# Patient Record
Sex: FEMALE | Race: WHITE | NOT HISPANIC OR LATINO | Employment: FULL TIME | URBAN - METROPOLITAN AREA
[De-identification: names, ages, dates, MRNs, and addresses within clinical notes are randomized per-mention and may not be internally consistent; named-entity substitution may affect disease eponyms.]

---

## 2023-02-01 PROBLEM — M65.332 TRIGGER MIDDLE FINGER OF LEFT HAND: Status: ACTIVE | Noted: 2023-02-01

## 2023-02-01 PROBLEM — R60.9 EDEMA: Status: ACTIVE | Noted: 2023-02-01

## 2023-02-01 PROBLEM — D50.9 IRON DEFICIENCY ANEMIA: Status: ACTIVE | Noted: 2023-02-01

## 2023-02-01 PROBLEM — K63.5 COLON POLYPS: Status: ACTIVE | Noted: 2023-02-01

## 2023-02-01 PROBLEM — E10.9 TYPE 1 DIABETES MELLITUS (MULTI): Status: ACTIVE | Noted: 2023-02-01

## 2023-02-01 PROBLEM — E87.6 HYPOKALEMIA: Status: ACTIVE | Noted: 2023-02-01

## 2023-02-01 PROBLEM — B37.31 VAGINITIS DUE TO CANDIDA: Status: ACTIVE | Noted: 2023-02-01

## 2023-02-01 PROBLEM — M54.6 THORACIC BACK PAIN: Status: ACTIVE | Noted: 2023-02-01

## 2023-02-01 PROBLEM — J34.89 RHINORRHEA: Status: ACTIVE | Noted: 2023-02-01

## 2023-02-01 PROBLEM — I10 BENIGN ESSENTIAL HYPERTENSION: Status: ACTIVE | Noted: 2023-02-01

## 2023-02-01 PROBLEM — J30.9 ALLERGIC RHINITIS: Status: ACTIVE | Noted: 2023-02-01

## 2023-02-01 PROBLEM — K21.9 CHRONIC GERD: Status: ACTIVE | Noted: 2023-02-01

## 2023-02-01 PROBLEM — N89.8 VAGINAL DISCHARGE: Status: ACTIVE | Noted: 2023-02-01

## 2023-02-01 PROBLEM — E78.5 HYPERLIPIDEMIA: Status: ACTIVE | Noted: 2023-02-01

## 2023-02-01 PROBLEM — L60.0 INGROWN TOENAIL OF RIGHT FOOT: Status: ACTIVE | Noted: 2023-02-01

## 2023-02-01 PROBLEM — F32.1 DEPRESSION, MAJOR, SINGLE EPISODE, MODERATE (MULTI): Status: ACTIVE | Noted: 2023-02-01

## 2023-02-01 PROBLEM — E55.9 VITAMIN D DEFICIENCY: Status: ACTIVE | Noted: 2023-02-01

## 2023-02-01 PROBLEM — Z85.828 HISTORY OF SKIN CANCER: Status: ACTIVE | Noted: 2023-02-01

## 2023-02-01 PROBLEM — J01.90 ACUTE SINUSITIS: Status: ACTIVE | Noted: 2023-02-01

## 2023-02-01 PROBLEM — D16.9 BENIGN NEOPLASM OF BONE: Status: ACTIVE | Noted: 2023-02-01

## 2023-02-01 PROBLEM — H92.01 RIGHT EAR PAIN: Status: ACTIVE | Noted: 2023-02-01

## 2023-02-01 PROBLEM — G43.019 MIGRAINE WITHOUT AURA, INTRACTABLE: Status: ACTIVE | Noted: 2023-02-01

## 2023-02-01 PROBLEM — L81.7 PIGMENTED PURPURIC DERMATOSIS: Status: ACTIVE | Noted: 2023-02-01

## 2023-02-01 PROBLEM — R92.8 ABNORMAL MAMMOGRAM OF RIGHT BREAST: Status: ACTIVE | Noted: 2023-02-01

## 2023-02-01 PROBLEM — F41.1 GENERALIZED ANXIETY DISORDER: Status: ACTIVE | Noted: 2023-02-01

## 2023-02-01 PROBLEM — K31.7 GASTRIC POLYP: Status: ACTIVE | Noted: 2023-02-01

## 2023-02-01 PROBLEM — H81.10 BENIGN PAROXYSMAL POSITIONAL VERTIGO: Status: ACTIVE | Noted: 2023-02-01

## 2023-02-01 PROBLEM — R20.2 TINGLING: Status: ACTIVE | Noted: 2023-02-01

## 2023-02-01 PROBLEM — R42 DIZZINESS: Status: ACTIVE | Noted: 2023-02-01

## 2023-02-01 PROBLEM — H69.93 DYSFUNCTION OF BOTH EUSTACHIAN TUBES: Status: ACTIVE | Noted: 2023-02-01

## 2023-02-01 PROBLEM — R00.2 PALPITATIONS: Status: ACTIVE | Noted: 2023-02-01

## 2023-02-01 PROBLEM — L30.1 DYSHIDROTIC DERMATITIS: Status: ACTIVE | Noted: 2023-02-01

## 2023-02-01 RX ORDER — ROSUVASTATIN CALCIUM 10 MG/1
1 TABLET, COATED ORAL DAILY
COMMUNITY
Start: 2016-08-08 | End: 2023-05-03 | Stop reason: SDUPTHER

## 2023-02-01 RX ORDER — CALCIUM CARB/VITAMIN D3/VIT K1 500-100-40
TABLET,CHEWABLE ORAL AS NEEDED
COMMUNITY

## 2023-02-01 RX ORDER — AMLODIPINE BESYLATE 5 MG/1
1 TABLET ORAL DAILY
COMMUNITY
Start: 2021-04-20 | End: 2023-10-23 | Stop reason: SDUPTHER

## 2023-02-01 RX ORDER — INSULIN LISPRO 100 [IU]/ML
INJECTION, SOLUTION INTRAVENOUS; SUBCUTANEOUS
COMMUNITY
Start: 2021-10-04

## 2023-02-01 RX ORDER — DOCUSATE SODIUM 100 MG/1
CAPSULE, LIQUID FILLED ORAL
COMMUNITY

## 2023-02-01 RX ORDER — FERROUS SULFATE 325(65) MG
1 TABLET ORAL EVERY OTHER DAY
COMMUNITY

## 2023-02-01 RX ORDER — MECLIZINE HYDROCHLORIDE 25 MG/1
1 TABLET ORAL EVERY 6 HOURS PRN
COMMUNITY
Start: 2022-11-30

## 2023-02-01 RX ORDER — HYDROCHLOROTHIAZIDE 25 MG/1
1 TABLET ORAL DAILY
COMMUNITY
Start: 2021-04-15 | End: 2023-04-28 | Stop reason: SDUPTHER

## 2023-02-01 RX ORDER — MONTELUKAST SODIUM 10 MG/1
1 TABLET ORAL DAILY
COMMUNITY
Start: 2021-08-31 | End: 2023-10-23 | Stop reason: SDUPTHER

## 2023-02-01 RX ORDER — FLUCONAZOLE 150 MG/1
1 TABLET ORAL ONCE
COMMUNITY
Start: 2022-04-26 | End: 2023-08-01 | Stop reason: ALTCHOICE

## 2023-02-01 RX ORDER — MOMETASONE FUROATE 50 UG/1
1 SPRAY, METERED NASAL 2 TIMES DAILY
COMMUNITY
Start: 2022-08-25 | End: 2023-08-01 | Stop reason: WASHOUT

## 2023-02-01 RX ORDER — POTASSIUM CHLORIDE 1500 MG/1
1 TABLET, EXTENDED RELEASE ORAL DAILY
COMMUNITY
Start: 2021-12-02 | End: 2023-10-23 | Stop reason: SDUPTHER

## 2023-02-01 RX ORDER — FLUOXETINE HYDROCHLORIDE 40 MG/1
1 CAPSULE ORAL DAILY
COMMUNITY
Start: 2020-06-02 | End: 2024-04-08 | Stop reason: SDUPTHER

## 2023-02-01 RX ORDER — CLONAZEPAM 0.5 MG/1
0.5 TABLET ORAL DAILY
COMMUNITY
Start: 2017-05-22 | End: 2023-03-13 | Stop reason: SDUPTHER

## 2023-02-01 RX ORDER — TELMISARTAN 80 MG/1
1 TABLET ORAL DAILY
COMMUNITY
End: 2023-10-23 | Stop reason: SDUPTHER

## 2023-02-01 RX ORDER — BLOOD SUGAR DIAGNOSTIC
STRIP MISCELLANEOUS
COMMUNITY
Start: 2019-06-26

## 2023-02-01 RX ORDER — LANSOPRAZOLE 30 MG/1
1 CAPSULE, DELAYED RELEASE ORAL 2 TIMES DAILY
COMMUNITY
Start: 2016-08-08 | End: 2023-04-13 | Stop reason: SDUPTHER

## 2023-02-01 RX ORDER — CARVEDILOL 12.5 MG/1
1 TABLET ORAL 2 TIMES DAILY
COMMUNITY
Start: 2016-11-07 | End: 2023-04-26 | Stop reason: SDUPTHER

## 2023-03-08 ENCOUNTER — TELEPHONE (OUTPATIENT)
Dept: PRIMARY CARE | Facility: CLINIC | Age: 58
End: 2023-03-08
Payer: COMMERCIAL

## 2023-03-08 DIAGNOSIS — F41.1 GENERALIZED ANXIETY DISORDER: ICD-10-CM

## 2023-03-08 LAB
ALANINE AMINOTRANSFERASE (SGPT) (U/L) IN SER/PLAS: 18 U/L (ref 7–45)
ALBUMIN (G/DL) IN SER/PLAS: 4 G/DL (ref 3.4–5)
ALKALINE PHOSPHATASE (U/L) IN SER/PLAS: 114 U/L (ref 33–110)
ANION GAP IN SER/PLAS: 9 MMOL/L (ref 10–20)
ASPARTATE AMINOTRANSFERASE (SGOT) (U/L) IN SER/PLAS: 21 U/L (ref 9–39)
BILIRUBIN TOTAL (MG/DL) IN SER/PLAS: 0.5 MG/DL (ref 0–1.2)
CALCIDIOL (25 OH VITAMIN D3) (NG/ML) IN SER/PLAS: 42 NG/ML
CALCIUM (MG/DL) IN SER/PLAS: 9.2 MG/DL (ref 8.6–10.3)
CARBON DIOXIDE, TOTAL (MMOL/L) IN SER/PLAS: 32 MMOL/L (ref 21–32)
CHLORIDE (MMOL/L) IN SER/PLAS: 98 MMOL/L (ref 98–107)
CHOLESTEROL (MG/DL) IN SER/PLAS: 154 MG/DL (ref 0–199)
CHOLESTEROL IN HDL (MG/DL) IN SER/PLAS: 57.4 MG/DL
CHOLESTEROL/HDL RATIO: 2.7
CREATININE (MG/DL) IN SER/PLAS: 0.65 MG/DL (ref 0.5–1.05)
ERYTHROCYTE DISTRIBUTION WIDTH (RATIO) BY AUTOMATED COUNT: 13.3 % (ref 11.5–14.5)
ERYTHROCYTE MEAN CORPUSCULAR HEMOGLOBIN CONCENTRATION (G/DL) BY AUTOMATED: 33.2 G/DL (ref 32–36)
ERYTHROCYTE MEAN CORPUSCULAR VOLUME (FL) BY AUTOMATED COUNT: 83 FL (ref 80–100)
ERYTHROCYTES (10*6/UL) IN BLOOD BY AUTOMATED COUNT: 4.38 X10E12/L (ref 4–5.2)
ESTIMATED AVERAGE GLUCOSE FOR HBA1C: 186 MG/DL
FERRITIN (UG/LL) IN SER/PLAS: 21 UG/L (ref 8–150)
GFR FEMALE: >90 ML/MIN/1.73M2
GLUCOSE (MG/DL) IN SER/PLAS: 185 MG/DL (ref 74–99)
HEMATOCRIT (%) IN BLOOD BY AUTOMATED COUNT: 36.5 % (ref 36–46)
HEMOGLOBIN (G/DL) IN BLOOD: 12.1 G/DL (ref 12–16)
HEMOGLOBIN A1C/HEMOGLOBIN TOTAL IN BLOOD: 8.1 %
IRON (UG/DL) IN SER/PLAS: 84 UG/DL (ref 35–150)
LDL: 75 MG/DL (ref 0–99)
LEUKOCYTES (10*3/UL) IN BLOOD BY AUTOMATED COUNT: 4.8 X10E9/L (ref 4.4–11.3)
PLATELETS (10*3/UL) IN BLOOD AUTOMATED COUNT: 318 X10E9/L (ref 150–450)
POTASSIUM (MMOL/L) IN SER/PLAS: 3.6 MMOL/L (ref 3.5–5.3)
PROTEIN TOTAL: 7 G/DL (ref 6.4–8.2)
SODIUM (MMOL/L) IN SER/PLAS: 135 MMOL/L (ref 136–145)
TRIGLYCERIDE (MG/DL) IN SER/PLAS: 108 MG/DL (ref 0–149)
UREA NITROGEN (MG/DL) IN SER/PLAS: 9 MG/DL (ref 6–23)
VLDL: 22 MG/DL (ref 0–40)

## 2023-03-08 NOTE — TELEPHONE ENCOUNTER
Patient needs refill for Clonazapam 0.5mg . Preferred pharmacy [facility]          Take 0.5 tablets (0.25 mg) by mouth in the morning. And 1 pill at bedtime when necessary anxiety

## 2023-03-13 RX ORDER — CLONAZEPAM 0.5 MG/1
0.25 TABLET ORAL DAILY
Qty: 45 TABLET | Refills: 1 | Status: SHIPPED | OUTPATIENT
Start: 2023-03-13 | End: 2023-04-26 | Stop reason: SDUPTHER

## 2023-03-15 ENCOUNTER — OFFICE VISIT (OUTPATIENT)
Dept: PRIMARY CARE | Facility: CLINIC | Age: 58
End: 2023-03-15
Payer: COMMERCIAL

## 2023-03-15 VITALS
OXYGEN SATURATION: 98 % | HEIGHT: 67 IN | BODY MASS INDEX: 29.19 KG/M2 | WEIGHT: 186 LBS | TEMPERATURE: 97.1 F | HEART RATE: 79 BPM | DIASTOLIC BLOOD PRESSURE: 78 MMHG | RESPIRATION RATE: 15 BRPM | SYSTOLIC BLOOD PRESSURE: 129 MMHG

## 2023-03-15 DIAGNOSIS — E87.6 HYPOKALEMIA: ICD-10-CM

## 2023-03-15 DIAGNOSIS — D50.9 IRON DEFICIENCY ANEMIA, UNSPECIFIED IRON DEFICIENCY ANEMIA TYPE: ICD-10-CM

## 2023-03-15 DIAGNOSIS — H81.10 BENIGN PAROXYSMAL POSITIONAL VERTIGO, UNSPECIFIED LATERALITY: ICD-10-CM

## 2023-03-15 DIAGNOSIS — F32.1 DEPRESSION, MAJOR, SINGLE EPISODE, MODERATE (MULTI): ICD-10-CM

## 2023-03-15 DIAGNOSIS — E55.9 VITAMIN D DEFICIENCY: ICD-10-CM

## 2023-03-15 DIAGNOSIS — E10.65 TYPE 1 DIABETES MELLITUS WITH HYPERGLYCEMIA (MULTI): Primary | ICD-10-CM

## 2023-03-15 DIAGNOSIS — E78.2 MIXED HYPERLIPIDEMIA: ICD-10-CM

## 2023-03-15 DIAGNOSIS — I10 BENIGN ESSENTIAL HYPERTENSION: ICD-10-CM

## 2023-03-15 DIAGNOSIS — F41.1 GENERALIZED ANXIETY DISORDER: ICD-10-CM

## 2023-03-15 PROBLEM — N89.8 VAGINAL DISCHARGE: Status: RESOLVED | Noted: 2023-02-01 | Resolved: 2023-03-15

## 2023-03-15 PROBLEM — J01.90 ACUTE SINUSITIS: Status: RESOLVED | Noted: 2023-02-01 | Resolved: 2023-03-15

## 2023-03-15 PROBLEM — B37.31 VAGINITIS DUE TO CANDIDA: Status: RESOLVED | Noted: 2023-02-01 | Resolved: 2023-03-15

## 2023-03-15 PROBLEM — L60.0 INGROWN TOENAIL OF RIGHT FOOT: Status: RESOLVED | Noted: 2023-02-01 | Resolved: 2023-03-15

## 2023-03-15 PROCEDURE — 99214 OFFICE O/P EST MOD 30 MIN: CPT | Performed by: FAMILY MEDICINE

## 2023-03-15 PROCEDURE — 3074F SYST BP LT 130 MM HG: CPT | Performed by: FAMILY MEDICINE

## 2023-03-15 PROCEDURE — 1036F TOBACCO NON-USER: CPT | Performed by: FAMILY MEDICINE

## 2023-03-15 PROCEDURE — 3078F DIAST BP <80 MM HG: CPT | Performed by: FAMILY MEDICINE

## 2023-03-15 PROCEDURE — 3052F HG A1C>EQUAL 8.0%<EQUAL 9.0%: CPT | Performed by: FAMILY MEDICINE

## 2023-03-15 PROCEDURE — 4010F ACE/ARB THERAPY RXD/TAKEN: CPT | Performed by: FAMILY MEDICINE

## 2023-03-15 RX ORDER — BLOOD-GLUCOSE SENSOR
EACH MISCELLANEOUS
COMMUNITY
Start: 2023-01-20

## 2023-03-15 RX ORDER — INFUSION SET FOR INSULIN PUMP
INFUSION SETS-PARAPHERNALIA MISCELLANEOUS
COMMUNITY
Start: 2023-01-21

## 2023-03-15 RX ORDER — INSULIN PUMP SYRINGE, 3 ML
EACH MISCELLANEOUS
COMMUNITY
Start: 2023-01-23

## 2023-03-15 ASSESSMENT — ENCOUNTER SYMPTOMS
PALPITATIONS: 0
SHORTNESS OF BREATH: 0
CONFUSION: 0
CHEST TIGHTNESS: 0
FEVER: 0
CHILLS: 0
ABDOMINAL PAIN: 0
ARTHRALGIAS: 0

## 2023-03-15 NOTE — ASSESSMENT & PLAN NOTE
A1c at 8.1% continue current treatment for now keep follow-up with endocrinology reviewed diabetic diet

## 2023-03-15 NOTE — PROGRESS NOTES
"Subjective   Patient ID: Kinsey Cao is a 57 y.o. female who presents for Follow-up (4 month).    HPI today for follow-up of ongoing healthcare issues and review of lab work.  She ended up getting into see an ENT regarding her dizziness.  She said she did a hearing test which came back okay and they recently did additional diagnostic testing and she has a follow-up appointment with them scheduled for next week to review results and discuss further plans.  From a symptom standpoint she says she is doing better.  She continues to follow with her endocrinologist with regards to her diabetes she is still using an insulin pump but is in the process of trying to get a new one in hopes of getting better control of her blood sugars.    Review of Systems   Constitutional:  Negative for chills and fever.   HENT:  Negative for congestion and ear pain.    Eyes:  Negative for visual disturbance.   Respiratory:  Negative for chest tightness and shortness of breath.    Cardiovascular:  Negative for chest pain and palpitations.   Gastrointestinal:  Negative for abdominal pain.   Musculoskeletal:  Negative for arthralgias.   Skin:  Negative for pallor.   Psychiatric/Behavioral:  Negative for confusion.        Objective   /78   Pulse 79   Temp 36.2 °C (97.1 °F)   Resp 15   Ht 1.702 m (5' 7\")   Wt 84.4 kg (186 lb)   SpO2 98%   BMI 29.13 kg/m²     Physical Exam  Vitals and nursing note reviewed.   Constitutional:       General: She is not in acute distress.     Appearance: Normal appearance. She is not ill-appearing.   HENT:      Head: Normocephalic and atraumatic.      Right Ear: Tympanic membrane, ear canal and external ear normal.      Left Ear: Tympanic membrane, ear canal and external ear normal.      Mouth/Throat:      Pharynx: Oropharynx is clear.   Eyes:      Extraocular Movements: Extraocular movements intact.   Cardiovascular:      Rate and Rhythm: Normal rate and regular rhythm.      Pulses: Normal " pulses.      Heart sounds: Normal heart sounds.   Pulmonary:      Effort: Pulmonary effort is normal.      Breath sounds: Normal breath sounds.   Abdominal:      General: Abdomen is flat. Bowel sounds are normal.      Palpations: Abdomen is soft.      Tenderness: There is no abdominal tenderness.   Musculoskeletal:         General: Normal range of motion.      Cervical back: Neck supple.   Skin:     General: Skin is warm.   Neurological:      Mental Status: She is alert and oriented to person, place, and time. Mental status is at baseline.   Psychiatric:         Mood and Affect: Mood normal.     Return to office 4 months with repeat fasting labs  Patient is in the process of scheduling her screening colonoscopy with Dr. Alves  Follow-up with ENT    Assessment/Plan   Problem List Items Addressed This Visit       Benign essential hypertension     Stable continue current treatment         Relevant Orders    Comprehensive Metabolic Panel    Follow Up In Primary Care    Benign paroxysmal positional vertigo     Improved complete evaluation with ENT         Depression, major, single episode, moderate (CMS/HCC)     Stable continue fluoxetine         Generalized anxiety disorder     Stable continue clonazepam         Hyperlipidemia     Stable continue store treatment and dietary modification         Relevant Orders    Comprehensive Metabolic Panel    Lipid Panel    Follow Up In Primary Care    Hypokalemia     Stable continue potassium supplementation         Relevant Orders    Follow Up In Primary Care    Iron deficiency anemia     Stable continue current supplementation and monitor         Relevant Orders    CBC    Ferritin    Iron    Follow Up In Primary Care    Type 1 diabetes mellitus (CMS/HCC) - Primary     A1c at 8.1% continue current treatment for now keep follow-up with endocrinology reviewed diabetic diet         Relevant Orders    Comprehensive Metabolic Panel    Hemoglobin A1C    Albumin , Urine Random    Follow Up  In Primary Care    Vitamin D deficiency     Stable continue to monitor

## 2023-04-12 ENCOUNTER — TELEPHONE (OUTPATIENT)
Dept: PRIMARY CARE | Facility: CLINIC | Age: 58
End: 2023-04-12
Payer: COMMERCIAL

## 2023-04-12 DIAGNOSIS — K21.9 CHRONIC GERD: ICD-10-CM

## 2023-04-12 NOTE — TELEPHONE ENCOUNTER
Rx Refill Request Telephone Encounter    Name:  Kinsey Cao  :  [id number]  Medication Name:        Lansoprazole 30 MG Oral Capsule delayed release take 1 cap daily                    Specific Pharmacy location:   [facility]    Date of last appointment:  03/15/2023  Date of next appointment:  2023  Best number to reach patient:  [phone]

## 2023-04-13 RX ORDER — LANSOPRAZOLE 30 MG/1
30 CAPSULE, DELAYED RELEASE ORAL 2 TIMES DAILY
Qty: 180 CAPSULE | Refills: 0 | Status: SHIPPED | OUTPATIENT
Start: 2023-04-13 | End: 2023-06-14 | Stop reason: SDUPTHER

## 2023-04-26 ENCOUNTER — TELEPHONE (OUTPATIENT)
Dept: PRIMARY CARE | Facility: CLINIC | Age: 58
End: 2023-04-26
Payer: COMMERCIAL

## 2023-04-26 DIAGNOSIS — I10 BENIGN ESSENTIAL HYPERTENSION: ICD-10-CM

## 2023-04-26 DIAGNOSIS — F41.1 GENERALIZED ANXIETY DISORDER: ICD-10-CM

## 2023-04-26 RX ORDER — CARVEDILOL 12.5 MG/1
12.5 TABLET ORAL 2 TIMES DAILY
Qty: 180 TABLET | Refills: 3 | Status: SHIPPED | OUTPATIENT
Start: 2023-04-26 | End: 2024-04-08 | Stop reason: SDUPTHER

## 2023-04-26 RX ORDER — CLONAZEPAM 0.5 MG/1
0.25 TABLET ORAL DAILY
Qty: 45 TABLET | Refills: 1 | Status: SHIPPED | OUTPATIENT
Start: 2023-04-26 | End: 2023-07-26 | Stop reason: SDUPTHER

## 2023-04-26 NOTE — TELEPHONE ENCOUNTER
Rx Refill Request Telephone Encounter    Name:  Kinsey Cao  :  [id number]  Medication Names: Hydrochlorothiazide 25MG tablet     Clonazepam .5MG Tablet    Carvedilol 12.5MG Tablet      Specific Pharmacy location:  [facility]/pharmacy [id number] [place] [address] [address]   Date of last appointment: 3/15/23   Date of next appointment: 23  Best number to reach patient: [phone]        The pharmacy needs authorization from Dr. Cooper to fill the 90 day scripts that were sent in previously.

## 2023-04-28 ENCOUNTER — TELEPHONE (OUTPATIENT)
Dept: PRIMARY CARE | Facility: CLINIC | Age: 58
End: 2023-04-28
Payer: COMMERCIAL

## 2023-04-28 DIAGNOSIS — I10 BENIGN ESSENTIAL HYPERTENSION: ICD-10-CM

## 2023-04-28 RX ORDER — HYDROCHLOROTHIAZIDE 25 MG/1
25 TABLET ORAL DAILY
Qty: 90 TABLET | Refills: 3 | Status: SHIPPED | OUTPATIENT
Start: 2023-04-28 | End: 2024-04-08 | Stop reason: SDUPTHER

## 2023-04-28 NOTE — TELEPHONE ENCOUNTER
Rx Refill Request Telephone Encounter    Name:  Kinsey Cao  :  [id number]  Medication Name:    Hydrochlorothiazide 25mg tablet   Specific Pharmacy location:    [facility]/pharmacy #[address] [place] [address] [address]      Date of last appointment: 3/15/23    Date of next appointment: 23   Best number to reach patient: [phone]

## 2023-05-03 ENCOUNTER — TELEPHONE (OUTPATIENT)
Dept: PRIMARY CARE | Facility: CLINIC | Age: 58
End: 2023-05-03
Payer: COMMERCIAL

## 2023-05-03 DIAGNOSIS — E78.2 MIXED HYPERLIPIDEMIA: Primary | ICD-10-CM

## 2023-05-03 RX ORDER — ROSUVASTATIN CALCIUM 10 MG/1
10 TABLET, COATED ORAL DAILY
Qty: 90 TABLET | Refills: 3 | Status: SHIPPED | OUTPATIENT
Start: 2023-05-03 | End: 2024-05-02

## 2023-05-03 NOTE — TELEPHONE ENCOUNTER
Rx Refill Request Telephone Encounter    Name:  Kinsey Cao  :  [id number]  Medication Name: rosuvastatin (Crestor) 10 mg tablet      Specific Pharmacy location:   [facility]/pharmacy #[address] [place] [address] [address]      Date of last appointment: 3/15/23    Date of next appointment:  23  Best number to reach patient:  [phone]

## 2023-06-14 ENCOUNTER — TELEPHONE (OUTPATIENT)
Dept: PRIMARY CARE | Facility: CLINIC | Age: 58
End: 2023-06-14
Payer: COMMERCIAL

## 2023-06-14 DIAGNOSIS — K21.9 CHRONIC GERD: ICD-10-CM

## 2023-06-14 RX ORDER — LANSOPRAZOLE 30 MG/1
30 CAPSULE, DELAYED RELEASE ORAL 2 TIMES DAILY
Qty: 180 CAPSULE | Refills: 0 | Status: SHIPPED | OUTPATIENT
Start: 2023-06-14 | End: 2023-08-10 | Stop reason: SDUPTHER

## 2023-06-14 NOTE — TELEPHONE ENCOUNTER
Rx Refill Request Telephone Encounter    Name:  Kinsey Cao  :  [id number]  Medication Name:      Lansoprazole 30 MG Oral Capsule delayed release take 1 cap twice daily                Specific Pharmacy location:  [facility]  Date of last appointment:  03/15/2023  Date of next appointment:  2023  Best number to reach patient:  [phone]

## 2023-07-10 ENCOUNTER — TELEPHONE (OUTPATIENT)
Dept: PRIMARY CARE | Facility: CLINIC | Age: 58
End: 2023-07-10
Payer: COMMERCIAL

## 2023-07-10 DIAGNOSIS — F41.1 GENERALIZED ANXIETY DISORDER: Primary | ICD-10-CM

## 2023-07-10 DIAGNOSIS — Z13.29 THYROID DISORDER SCREEN: ICD-10-CM

## 2023-07-10 NOTE — TELEPHONE ENCOUNTER
Patient called in and said she was experiencing some symptoms of anxiety, trouble sleeping, as well as some issues with hair loss and a possible rash on her neck. She believes this may have something to do with her thyroid, and wanted to possibly come in to be evaluated, or whatever you recommend she do to combat this.

## 2023-07-19 ENCOUNTER — APPOINTMENT (OUTPATIENT)
Dept: PRIMARY CARE | Facility: CLINIC | Age: 58
End: 2023-07-19
Payer: COMMERCIAL

## 2023-07-26 ENCOUNTER — TELEPHONE (OUTPATIENT)
Dept: PRIMARY CARE | Facility: CLINIC | Age: 58
End: 2023-07-26
Payer: COMMERCIAL

## 2023-07-26 DIAGNOSIS — F41.1 GENERALIZED ANXIETY DISORDER: ICD-10-CM

## 2023-07-26 RX ORDER — CLONAZEPAM 0.5 MG/1
TABLET ORAL
Qty: 45 TABLET | Refills: 2 | Status: SHIPPED | OUTPATIENT
Start: 2023-07-26 | End: 2023-12-11 | Stop reason: SDUPTHER

## 2023-07-26 NOTE — TELEPHONE ENCOUNTER
Rx Refill Request Telephone Encounter    Name:  Kinsey Cao  :  [id number]  Medication Name:      clonazePAM (KlonoPIN) 0.5 mg tablet            Specific Pharmacy location: [facility]/pharmacy #[address] [place] [address] [address]    Date of last appointment:  3/15/23  Date of next appointment: 23   Best number to reach patient: [phone]

## 2023-07-27 ENCOUNTER — LAB (OUTPATIENT)
Dept: LAB | Facility: LAB | Age: 58
End: 2023-07-27
Payer: COMMERCIAL

## 2023-07-27 DIAGNOSIS — F41.1 GENERALIZED ANXIETY DISORDER: ICD-10-CM

## 2023-07-27 DIAGNOSIS — D50.9 IRON DEFICIENCY ANEMIA, UNSPECIFIED IRON DEFICIENCY ANEMIA TYPE: ICD-10-CM

## 2023-07-27 DIAGNOSIS — Z13.29 THYROID DISORDER SCREEN: ICD-10-CM

## 2023-07-27 DIAGNOSIS — I10 BENIGN ESSENTIAL HYPERTENSION: ICD-10-CM

## 2023-07-27 DIAGNOSIS — E78.2 MIXED HYPERLIPIDEMIA: ICD-10-CM

## 2023-07-27 DIAGNOSIS — E10.65 TYPE 1 DIABETES MELLITUS WITH HYPERGLYCEMIA (MULTI): ICD-10-CM

## 2023-07-27 LAB
ALANINE AMINOTRANSFERASE (SGPT) (U/L) IN SER/PLAS: 21 U/L (ref 7–45)
ALBUMIN (G/DL) IN SER/PLAS: 3.9 G/DL (ref 3.4–5)
ALKALINE PHOSPHATASE (U/L) IN SER/PLAS: 133 U/L (ref 33–110)
ANION GAP IN SER/PLAS: 10 MMOL/L (ref 10–20)
ASPARTATE AMINOTRANSFERASE (SGOT) (U/L) IN SER/PLAS: 23 U/L (ref 9–39)
BILIRUBIN TOTAL (MG/DL) IN SER/PLAS: 0.4 MG/DL (ref 0–1.2)
CALCIUM (MG/DL) IN SER/PLAS: 8.9 MG/DL (ref 8.6–10.3)
CARBON DIOXIDE, TOTAL (MMOL/L) IN SER/PLAS: 30 MMOL/L (ref 21–32)
CHLORIDE (MMOL/L) IN SER/PLAS: 99 MMOL/L (ref 98–107)
CHOLESTEROL (MG/DL) IN SER/PLAS: 131 MG/DL (ref 0–199)
CHOLESTEROL IN HDL (MG/DL) IN SER/PLAS: 53.2 MG/DL
CHOLESTEROL/HDL RATIO: 2.5
CREATININE (MG/DL) IN SER/PLAS: 0.68 MG/DL (ref 0.5–1.05)
ERYTHROCYTE DISTRIBUTION WIDTH (RATIO) BY AUTOMATED COUNT: 13.2 % (ref 11.5–14.5)
ERYTHROCYTE MEAN CORPUSCULAR HEMOGLOBIN CONCENTRATION (G/DL) BY AUTOMATED: 33 G/DL (ref 32–36)
ERYTHROCYTE MEAN CORPUSCULAR VOLUME (FL) BY AUTOMATED COUNT: 83 FL (ref 80–100)
ERYTHROCYTES (10*6/UL) IN BLOOD BY AUTOMATED COUNT: 4.18 X10E12/L (ref 4–5.2)
ESTIMATED AVERAGE GLUCOSE FOR HBA1C: 192 MG/DL
FERRITIN (UG/LL) IN SER/PLAS: 29 UG/L (ref 8–150)
GFR FEMALE: >90 ML/MIN/1.73M2
GLUCOSE (MG/DL) IN SER/PLAS: 118 MG/DL (ref 74–99)
HEMATOCRIT (%) IN BLOOD BY AUTOMATED COUNT: 34.5 % (ref 36–46)
HEMOGLOBIN (G/DL) IN BLOOD: 11.4 G/DL (ref 12–16)
HEMOGLOBIN A1C/HEMOGLOBIN TOTAL IN BLOOD: 8.3 %
IRON (UG/DL) IN SER/PLAS: 75 UG/DL (ref 35–150)
LDL: 65 MG/DL (ref 0–99)
LEUKOCYTES (10*3/UL) IN BLOOD BY AUTOMATED COUNT: 5.1 X10E9/L (ref 4.4–11.3)
PLATELETS (10*3/UL) IN BLOOD AUTOMATED COUNT: 275 X10E9/L (ref 150–450)
POTASSIUM (MMOL/L) IN SER/PLAS: 3.5 MMOL/L (ref 3.5–5.3)
PROTEIN TOTAL: 6.8 G/DL (ref 6.4–8.2)
SODIUM (MMOL/L) IN SER/PLAS: 135 MMOL/L (ref 136–145)
THYROTROPIN (MIU/L) IN SER/PLAS BY DETECTION LIMIT <= 0.05 MIU/L: 1.75 MIU/L (ref 0.44–3.98)
TRIGLYCERIDE (MG/DL) IN SER/PLAS: 63 MG/DL (ref 0–149)
UREA NITROGEN (MG/DL) IN SER/PLAS: 8 MG/DL (ref 6–23)
VLDL: 13 MG/DL (ref 0–40)

## 2023-07-27 PROCEDURE — 85027 COMPLETE CBC AUTOMATED: CPT

## 2023-07-27 PROCEDURE — 84443 ASSAY THYROID STIM HORMONE: CPT

## 2023-07-27 PROCEDURE — 80053 COMPREHEN METABOLIC PANEL: CPT

## 2023-07-27 PROCEDURE — 80061 LIPID PANEL: CPT

## 2023-07-27 PROCEDURE — 82570 ASSAY OF URINE CREATININE: CPT

## 2023-07-27 PROCEDURE — 82728 ASSAY OF FERRITIN: CPT

## 2023-07-27 PROCEDURE — 83540 ASSAY OF IRON: CPT

## 2023-07-27 PROCEDURE — 83036 HEMOGLOBIN GLYCOSYLATED A1C: CPT

## 2023-07-27 PROCEDURE — 82043 UR ALBUMIN QUANTITATIVE: CPT

## 2023-07-27 PROCEDURE — 36415 COLL VENOUS BLD VENIPUNCTURE: CPT

## 2023-07-28 LAB
ALBUMIN (MG/L) IN URINE: <7 MG/L
ALBUMIN/CREATININE (UG/MG) IN URINE: NORMAL UG/MG CRT (ref 0–30)
CREATININE (MG/DL) IN URINE: 54.8 MG/DL (ref 20–320)

## 2023-08-01 ENCOUNTER — OFFICE VISIT (OUTPATIENT)
Dept: PRIMARY CARE | Facility: CLINIC | Age: 58
End: 2023-08-01
Payer: COMMERCIAL

## 2023-08-01 VITALS
BODY MASS INDEX: 28.19 KG/M2 | TEMPERATURE: 96.5 F | RESPIRATION RATE: 14 BRPM | SYSTOLIC BLOOD PRESSURE: 128 MMHG | HEART RATE: 81 BPM | OXYGEN SATURATION: 98 % | WEIGHT: 180 LBS | DIASTOLIC BLOOD PRESSURE: 81 MMHG

## 2023-08-01 DIAGNOSIS — F32.1 DEPRESSION, MAJOR, SINGLE EPISODE, MODERATE (MULTI): ICD-10-CM

## 2023-08-01 DIAGNOSIS — E10.65 TYPE 1 DIABETES MELLITUS WITH HYPERGLYCEMIA (MULTI): Primary | ICD-10-CM

## 2023-08-01 DIAGNOSIS — E55.9 VITAMIN D DEFICIENCY: ICD-10-CM

## 2023-08-01 DIAGNOSIS — D50.9 IRON DEFICIENCY ANEMIA, UNSPECIFIED IRON DEFICIENCY ANEMIA TYPE: ICD-10-CM

## 2023-08-01 DIAGNOSIS — E78.2 MIXED HYPERLIPIDEMIA: ICD-10-CM

## 2023-08-01 DIAGNOSIS — E87.6 HYPOKALEMIA: ICD-10-CM

## 2023-08-01 DIAGNOSIS — I10 BENIGN ESSENTIAL HYPERTENSION: ICD-10-CM

## 2023-08-01 PROCEDURE — 4010F ACE/ARB THERAPY RXD/TAKEN: CPT | Performed by: FAMILY MEDICINE

## 2023-08-01 PROCEDURE — 1036F TOBACCO NON-USER: CPT | Performed by: FAMILY MEDICINE

## 2023-08-01 PROCEDURE — 3074F SYST BP LT 130 MM HG: CPT | Performed by: FAMILY MEDICINE

## 2023-08-01 PROCEDURE — 99214 OFFICE O/P EST MOD 30 MIN: CPT | Performed by: FAMILY MEDICINE

## 2023-08-01 PROCEDURE — 3079F DIAST BP 80-89 MM HG: CPT | Performed by: FAMILY MEDICINE

## 2023-08-01 PROCEDURE — 3052F HG A1C>EQUAL 8.0%<EQUAL 9.0%: CPT | Performed by: FAMILY MEDICINE

## 2023-08-01 RX ORDER — TERCONAZOLE 8 MG/G
1 CREAM VAGINAL NIGHTLY
COMMUNITY

## 2023-08-01 RX ORDER — ACETAMINOPHEN 500 MG
50 TABLET ORAL 2 TIMES DAILY
COMMUNITY

## 2023-08-01 ASSESSMENT — ENCOUNTER SYMPTOMS
FEVER: 0
CONFUSION: 0
PALPITATIONS: 0
CHEST TIGHTNESS: 0
CHILLS: 0
SHORTNESS OF BREATH: 0
ARTHRALGIAS: 0
ABDOMINAL PAIN: 0

## 2023-08-01 NOTE — ASSESSMENT & PLAN NOTE
A1c 8.3% she continues to work closely with endocrinology with regards to insulin pump and diabetes management.

## 2023-08-01 NOTE — PROGRESS NOTES
Subjective   Patient ID: Kinsey Cao is a 57 y.o. female who presents for Follow-up (4 month).    HPI   Patient today for follow-up of ongoing healthcare issues and review of lab work.  She continues to follow closely with her endocrinologist with regards to diabetic management.  She is also scheduled within the week to have colonoscopy updated.  Review of Systems   Constitutional:  Negative for chills and fever.   HENT:  Negative for congestion and ear pain.    Eyes:  Negative for visual disturbance.   Respiratory:  Negative for chest tightness and shortness of breath.    Cardiovascular:  Negative for chest pain and palpitations.   Gastrointestinal:  Negative for abdominal pain.   Musculoskeletal:  Negative for arthralgias.   Skin:  Negative for pallor.   Psychiatric/Behavioral:  Negative for confusion.        Objective   /81   Pulse 81   Temp 35.8 °C (96.5 °F)   Resp 14   Wt 81.6 kg (180 lb)   SpO2 98%   BMI 28.19 kg/m²     Physical Exam  Vitals and nursing note reviewed.   Constitutional:       General: She is not in acute distress.     Appearance: Normal appearance. She is not ill-appearing.   HENT:      Head: Normocephalic and atraumatic.      Right Ear: Tympanic membrane, ear canal and external ear normal.      Left Ear: Tympanic membrane, ear canal and external ear normal.      Mouth/Throat:      Pharynx: Oropharynx is clear.   Eyes:      Extraocular Movements: Extraocular movements intact.   Cardiovascular:      Rate and Rhythm: Normal rate and regular rhythm.      Pulses: Normal pulses.      Heart sounds: Normal heart sounds.   Pulmonary:      Effort: Pulmonary effort is normal.      Breath sounds: Normal breath sounds.   Abdominal:      General: Abdomen is flat. Bowel sounds are normal.      Palpations: Abdomen is soft.      Tenderness: There is no abdominal tenderness.   Musculoskeletal:         General: Normal range of motion.      Cervical back: Neck supple.   Skin:     General: Skin  is warm.   Neurological:      Mental Status: She is alert and oriented to person, place, and time. Mental status is at baseline.   Psychiatric:         Mood and Affect: Mood normal.       Recent Results (from the past 1008 hour(s))   CBC    Collection Time: 07/27/23 10:24 AM   Result Value Ref Range    WBC 5.1 4.4 - 11.3 x10E9/L    RBC 4.18 4.00 - 5.20 x10E12/L    Hemoglobin 11.4 (L) 12.0 - 16.0 g/dL    Hematocrit 34.5 (L) 36.0 - 46.0 %    MCV 83 80 - 100 fL    MCHC 33.0 32.0 - 36.0 g/dL    Platelets 275 150 - 450 x10E9/L    RDW 13.2 11.5 - 14.5 %   Comprehensive Metabolic Panel    Collection Time: 07/27/23 10:24 AM   Result Value Ref Range    Glucose 118 (H) 74 - 99 mg/dL    Sodium 135 (L) 136 - 145 mmol/L    Potassium 3.5 3.5 - 5.3 mmol/L    Chloride 99 98 - 107 mmol/L    Bicarbonate 30 21 - 32 mmol/L    Anion Gap 10 10 - 20 mmol/L    Urea Nitrogen 8 6 - 23 mg/dL    Creatinine 0.68 0.50 - 1.05 mg/dL    GFR Female >90 >90 mL/min/1.73m2    Calcium 8.9 8.6 - 10.3 mg/dL    Albumin 3.9 3.4 - 5.0 g/dL    Alkaline Phosphatase 133 (H) 33 - 110 U/L    Total Protein 6.8 6.4 - 8.2 g/dL    AST 23 9 - 39 U/L    Total Bilirubin 0.4 0.0 - 1.2 mg/dL    ALT (SGPT) 21 7 - 45 U/L   Ferritin    Collection Time: 07/27/23 10:24 AM   Result Value Ref Range    Ferritin 29 8 - 150 ug/L   Hemoglobin A1C    Collection Time: 07/27/23 10:24 AM   Result Value Ref Range    Hemoglobin A1C 8.3 (A) %    Estimated Average Glucose 192 MG/DL   Iron    Collection Time: 07/27/23 10:24 AM   Result Value Ref Range    Iron 75 35 - 150 ug/dL   Lipid Panel    Collection Time: 07/27/23 10:24 AM   Result Value Ref Range    Cholesterol 131 0 - 199 mg/dL    HDL 53.2 mg/dL    Cholesterol/HDL Ratio 2.5     LDL 65 0 - 99 mg/dL    VLDL 13 0 - 40 mg/dL    Triglycerides 63 0 - 149 mg/dL   Albumin , Urine Random    Collection Time: 07/27/23 10:24 AM   Result Value Ref Range    ALBUMIN (MG/L) IN URINE <7.0 Not Established mg/L    Albumin/Creatine Ratio SEE COMMENT 0.0  - 30.0 ug/mg crt    Creatinine, Urine 54.8 20.0 - 320.0 mg/dL   TSH with reflex to Free T4 if abnormal    Collection Time: 07/27/23 10:24 AM   Result Value Ref Range    TSH 1.75 0.44 - 3.98 mIU/L     Recent labs reviewed with patient  Continue current medications and continue to follow closely with endocrinology with regards to diabetic management    Keep yearly diabetic eye exams    Return to office 4 months with repeat fasting labs    Assessment/Plan   Problem List Items Addressed This Visit       Benign essential hypertension     Stable continue current treatment         Relevant Orders    CBC    Comprehensive Metabolic Panel    Follow Up In Primary Care - Established    Depression, major, single episode, moderate (CMS/HCC)     Stable continue current medication/fluoxetine         Relevant Orders    Follow Up In Primary Care - Established    Hyperlipidemia     Stable continue Crestor 10 mg daily         Relevant Orders    Comprehensive Metabolic Panel    Lipid Panel    Follow Up In Primary Care - Established    Hypokalemia     Stable continue to monitor and supplement         Iron deficiency anemia     Stable continue to monitor and supplement         Relevant Orders    CBC    Comprehensive Metabolic Panel    Ferritin    Iron    Follow Up In Primary Care - Established    Type 1 diabetes mellitus (CMS/HCC) - Primary     A1c 8.3% she continues to work closely with endocrinology with regards to insulin pump and diabetes management.         Relevant Orders    Comprehensive Metabolic Panel    Hemoglobin A1C    Follow Up In Primary Care - Established    Vitamin D deficiency     Continue to monitor and supplement         Relevant Orders    Vitamin D, Total    Follow Up In Primary Care - Established

## 2023-08-02 ENCOUNTER — APPOINTMENT (OUTPATIENT)
Dept: PRIMARY CARE | Facility: CLINIC | Age: 58
End: 2023-08-02
Payer: COMMERCIAL

## 2023-08-03 ENCOUNTER — HOSPITAL ENCOUNTER (OUTPATIENT)
Dept: DATA CONVERSION | Facility: HOSPITAL | Age: 58
End: 2023-08-03
Attending: SURGERY | Admitting: SURGERY
Payer: COMMERCIAL

## 2023-08-03 DIAGNOSIS — Z12.11 ENCOUNTER FOR SCREENING FOR MALIGNANT NEOPLASM OF COLON: ICD-10-CM

## 2023-08-08 ENCOUNTER — APPOINTMENT (OUTPATIENT)
Dept: PRIMARY CARE | Facility: CLINIC | Age: 58
End: 2023-08-08
Payer: COMMERCIAL

## 2023-08-09 DIAGNOSIS — K21.9 CHRONIC GERD: ICD-10-CM

## 2023-08-10 ENCOUNTER — TELEPHONE (OUTPATIENT)
Dept: PRIMARY CARE | Facility: CLINIC | Age: 58
End: 2023-08-10
Payer: COMMERCIAL

## 2023-08-10 DIAGNOSIS — K21.9 CHRONIC GERD: ICD-10-CM

## 2023-08-10 RX ORDER — LANSOPRAZOLE 30 MG/1
30 CAPSULE, DELAYED RELEASE ORAL 2 TIMES DAILY
Qty: 120 CAPSULE | Refills: 1 | OUTPATIENT
Start: 2023-08-10

## 2023-08-10 RX ORDER — LANSOPRAZOLE 30 MG/1
30 CAPSULE, DELAYED RELEASE ORAL 2 TIMES DAILY
Qty: 180 CAPSULE | Refills: 3 | Status: SHIPPED | OUTPATIENT
Start: 2023-08-10 | End: 2023-11-03 | Stop reason: ALTCHOICE

## 2023-08-10 NOTE — TELEPHONE ENCOUNTER
Rx Refill Request Telephone Encounter    Name:  Kinsey Cao  :  [id number]  Medication Name:      lansoprazole (Prevacid) 30 mg DR capsule         Specific Pharmacy location:   [facility]/pharmacy #[address] [place] [address] [address]         Date of last appointment:  23  Date of next appointment:  23  Best number to reach patient: [phone]

## 2023-08-15 LAB — HCG, URINE: NEGATIVE

## 2023-09-29 VITALS — WEIGHT: 180.78 LBS | BODY MASS INDEX: 28.37 KG/M2 | HEIGHT: 67 IN

## 2023-10-05 ENCOUNTER — ANESTHESIA EVENT (OUTPATIENT)
Dept: GASTROENTEROLOGY | Facility: HOSPITAL | Age: 58
End: 2023-10-05
Payer: COMMERCIAL

## 2023-10-05 ENCOUNTER — ANESTHESIA (OUTPATIENT)
Dept: GASTROENTEROLOGY | Facility: HOSPITAL | Age: 58
End: 2023-10-05
Payer: COMMERCIAL

## 2023-10-05 ENCOUNTER — HOSPITAL ENCOUNTER (OUTPATIENT)
Dept: GASTROENTEROLOGY | Facility: HOSPITAL | Age: 58
Discharge: HOME | End: 2023-10-05
Payer: COMMERCIAL

## 2023-10-05 VITALS
WEIGHT: 180 LBS | HEIGHT: 67 IN | RESPIRATION RATE: 14 BRPM | HEART RATE: 68 BPM | TEMPERATURE: 97.5 F | SYSTOLIC BLOOD PRESSURE: 133 MMHG | OXYGEN SATURATION: 97 % | BODY MASS INDEX: 28.25 KG/M2 | DIASTOLIC BLOOD PRESSURE: 80 MMHG

## 2023-10-05 DIAGNOSIS — K21.9 GASTRO-ESOPHAGEAL REFLUX DISEASE WITHOUT ESOPHAGITIS: Primary | ICD-10-CM

## 2023-10-05 PROCEDURE — 2720000007 HC OR 272 NO HCPCS

## 2023-10-05 PROCEDURE — 43239 EGD BIOPSY SINGLE/MULTIPLE: CPT | Performed by: SURGERY

## 2023-10-05 PROCEDURE — 7100000009 HC PHASE TWO TIME - INITIAL BASE CHARGE

## 2023-10-05 PROCEDURE — 7100000010 HC PHASE TWO TIME - EACH INCREMENTAL 1 MINUTE

## 2023-10-05 PROCEDURE — 88305 TISSUE EXAM BY PATHOLOGIST: CPT | Mod: TC,PORLAB | Performed by: SURGERY

## 2023-10-05 PROCEDURE — 2500000004 HC RX 250 GENERAL PHARMACY W/ HCPCS (ALT 636 FOR OP/ED): Performed by: NURSE ANESTHETIST, CERTIFIED REGISTERED

## 2023-10-05 PROCEDURE — 88305 TISSUE EXAM BY PATHOLOGIST: CPT | Mod: TC,SUR,CMCLAB,PORLAB | Performed by: SURGERY

## 2023-10-05 PROCEDURE — 2500000005 HC RX 250 GENERAL PHARMACY W/O HCPCS: Performed by: NURSE ANESTHETIST, CERTIFIED REGISTERED

## 2023-10-05 PROCEDURE — 3700000001 HC GENERAL ANESTHESIA TIME - INITIAL BASE CHARGE

## 2023-10-05 PROCEDURE — 88305 TISSUE EXAM BY PATHOLOGIST: CPT | Performed by: PATHOLOGY

## 2023-10-05 PROCEDURE — 3700000002 HC GENERAL ANESTHESIA TIME - EACH INCREMENTAL 1 MINUTE

## 2023-10-05 RX ORDER — PROPOFOL 10 MG/ML
INJECTION, EMULSION INTRAVENOUS AS NEEDED
Status: DISCONTINUED | OUTPATIENT
Start: 2023-10-05 | End: 2023-10-05

## 2023-10-05 RX ORDER — FENTANYL CITRATE 50 UG/ML
INJECTION, SOLUTION INTRAMUSCULAR; INTRAVENOUS AS NEEDED
Status: DISCONTINUED | OUTPATIENT
Start: 2023-10-05 | End: 2023-10-05

## 2023-10-05 RX ORDER — ONDANSETRON HYDROCHLORIDE 2 MG/ML
INJECTION, SOLUTION INTRAVENOUS AS NEEDED
Status: DISCONTINUED | OUTPATIENT
Start: 2023-10-05 | End: 2023-10-05

## 2023-10-05 RX ORDER — SODIUM CHLORIDE, SODIUM LACTATE, POTASSIUM CHLORIDE, CALCIUM CHLORIDE 600; 310; 30; 20 MG/100ML; MG/100ML; MG/100ML; MG/100ML
20 INJECTION, SOLUTION INTRAVENOUS CONTINUOUS
Status: DISCONTINUED | OUTPATIENT
Start: 2023-10-05 | End: 2023-10-06

## 2023-10-05 RX ORDER — LIDOCAINE HYDROCHLORIDE 20 MG/ML
INJECTION, SOLUTION EPIDURAL; INFILTRATION; INTRACAUDAL; PERINEURAL AS NEEDED
Status: DISCONTINUED | OUTPATIENT
Start: 2023-10-05 | End: 2023-10-05

## 2023-10-05 RX ADMIN — FENTANYL CITRATE 50 MCG: 50 INJECTION, SOLUTION INTRAMUSCULAR; INTRAVENOUS at 08:41

## 2023-10-05 RX ADMIN — PROPOFOL 20 MG: 10 INJECTION, EMULSION INTRAVENOUS at 08:48

## 2023-10-05 RX ADMIN — LIDOCAINE HYDROCHLORIDE 50 MG: 20 INJECTION, SOLUTION EPIDURAL; INFILTRATION; INTRACAUDAL; PERINEURAL at 08:43

## 2023-10-05 RX ADMIN — PROPOFOL 20 MG: 10 INJECTION, EMULSION INTRAVENOUS at 08:45

## 2023-10-05 RX ADMIN — SODIUM CHLORIDE: 9 INJECTION, SOLUTION INTRAVENOUS at 08:30

## 2023-10-05 RX ADMIN — ONDANSETRON 4 MG: 2 INJECTION INTRAMUSCULAR; INTRAVENOUS at 08:35

## 2023-10-05 RX ADMIN — FENTANYL CITRATE 50 MCG: 50 INJECTION, SOLUTION INTRAMUSCULAR; INTRAVENOUS at 08:43

## 2023-10-05 RX ADMIN — PROPOFOL 30 MG: 10 INJECTION, EMULSION INTRAVENOUS at 08:43

## 2023-10-05 RX ADMIN — LIDOCAINE HYDROCHLORIDE 50 MG: 20 INJECTION, SOLUTION EPIDURAL; INFILTRATION; INTRACAUDAL; PERINEURAL at 08:41

## 2023-10-05 RX ADMIN — PROPOFOL 50 MG: 10 INJECTION, EMULSION INTRAVENOUS at 08:41

## 2023-10-05 SDOH — HEALTH STABILITY: MENTAL HEALTH: CURRENT SMOKER: 0

## 2023-10-05 ASSESSMENT — PAIN - FUNCTIONAL ASSESSMENT
PAIN_FUNCTIONAL_ASSESSMENT: 0-10

## 2023-10-05 ASSESSMENT — COLUMBIA-SUICIDE SEVERITY RATING SCALE - C-SSRS
1. IN THE PAST MONTH, HAVE YOU WISHED YOU WERE DEAD OR WISHED YOU COULD GO TO SLEEP AND NOT WAKE UP?: NO
6. HAVE YOU EVER DONE ANYTHING, STARTED TO DO ANYTHING, OR PREPARED TO DO ANYTHING TO END YOUR LIFE?: NO
2. HAVE YOU ACTUALLY HAD ANY THOUGHTS OF KILLING YOURSELF?: NO

## 2023-10-05 ASSESSMENT — PAIN SCALES - GENERAL
PAINLEVEL_OUTOF10: 0 - NO PAIN
PAINLEVEL_OUTOF10: 0 - NO PAIN
PAIN_LEVEL: 0

## 2023-10-05 NOTE — ANESTHESIA POSTPROCEDURE EVALUATION
Patient: Kinsey Cao    Procedure Summary       Date: 10/05/23 Room / Location: [facility]    Anesthesia Start: 0827 Anesthesia Stop: 0906    Procedure: EGD Diagnosis:       Gastro-esophageal reflux disease without esophagitis      Gastro-esophageal reflux disease without esophagitis    Scheduled Providers: Josh Alves MD Responsible Provider: OJ Parker    Anesthesia Type: MAC ASA Status: 3            Anesthesia Type: MAC    Vitals Value Taken Time   /72 10/05/23 0903   Temp 36.6 °C (97.9 °F) 10/05/23 0903   Pulse 68 10/05/23 0903   Resp 18 10/05/23 0903   SpO2 97 % 10/05/23 0903       Anesthesia Post Evaluation    Patient location during evaluation: bedside  Patient participation: complete - patient participated  Level of consciousness: awake  Pain score: 0  Pain management: adequate  Airway patency: patent  Cardiovascular status: acceptable  Respiratory status: acceptable  Hydration status: acceptable    No notable events documented.

## 2023-10-05 NOTE — H&P
"History Of Present Illness  Kinsey Cao is a 58 y.o. female presenting with rect bleed.     Past Medical History  She has a past medical history of Delayed emergence from general anesthesia, Diabetes mellitus (CMS/HCC), GERD (gastroesophageal reflux disease), Hypertension, Personal history of other diseases of the circulatory system (10/25/2019), Personal history of other diseases of the digestive system (10/25/2019), Personal history of other endocrine, nutritional and metabolic disease (10/25/2019), and Personal history of other medical treatment (10/25/2019).    Surgical History  She has a past surgical history that includes Other surgical history (10/25/2019); Other surgical history (10/25/2019); Other surgical history (10/25/2019); and Implant.     Social History  She reports that she has never smoked. She has been exposed to tobacco smoke. She has never used smokeless tobacco. She reports that she does not currently use alcohol. She reports that she does not use drugs.    Family History  Family History   Problem Relation Name Age of Onset   • Diabetes Sister     • Hypertension Sister     • Diabetes Brother     • Hypertension Brother     • Lung cancer Maternal Grandfather          Allergies  Cephalosporins, Ciprofloxacin, Doxycycline, Hydrocodone-acetaminophen, Latex, and Metoclopramide hcl    Review of Systems     Physical Exam     Last Recorded Vitals  Pulse 74, temperature 36.4 °C (97.5 °F), resp. rate 14, height 1.702 m (5' 7\"), weight 81.6 kg (180 lb), SpO2 98 %.    Relevant Results             Assessment/Plan   Active Problems:  There are no active Hospital Problems.      Rect bleed       I spent 5 minutes in the professional and overall care of this patient.      Josh Alves MD    "

## 2023-10-05 NOTE — ANESTHESIA PREPROCEDURE EVALUATION
Patient: Kinsey Cao    Procedure Information       Date/Time: 10/05/23 0800    Scheduled providers: Josh Alves MD    Procedure: EGD    Location: [facility]            Relevant Problems   Cardiovascular   (+) Benign essential hypertension   (+) Hyperlipidemia   (+) Thoracic back pain      Endocrine   (+) Type 1 diabetes mellitus (CMS/HCC)      GI   (+) Chronic GERD      Neuro/Psych   (+) Depression, major, single episode, moderate (CMS/HCC)   (+) Generalized anxiety disorder      Hematology   (+) Iron deficiency anemia       Clinical information reviewed:    Allergies  Meds               NPO Detail:  NPO/Void Status  Date of Last Liquid: 10/04/23  Time of Last Liquid: 2245  Date of Last Solid: 10/04/23  Time of Last Solid: 2245         Physical Exam    Airway  Mallampati: III  Neck ROM: full     Cardiovascular   Rhythm: regular  Rate: normal     Dental    Pulmonary    Abdominal      Other findings: Small mouth opening        Anesthesia Plan    ASA 3     MAC     The patient is not a current smoker.    intravenous induction   Anesthetic plan and risks discussed with patient.

## 2023-10-10 NOTE — ADDENDUM NOTE
Encounter addended by: Leti Meyers RN on: 10/10/2023 12:54 PM   Actions taken: Check Out activity completed

## 2023-10-11 LAB
LABORATORY COMMENT REPORT: NORMAL
PATH REPORT.FINAL DX SPEC: NORMAL
PATH REPORT.GROSS SPEC: NORMAL
PATH REPORT.TOTAL CANCER: NORMAL

## 2023-10-23 ENCOUNTER — TELEPHONE (OUTPATIENT)
Dept: PRIMARY CARE | Facility: CLINIC | Age: 58
End: 2023-10-23
Payer: COMMERCIAL

## 2023-10-23 DIAGNOSIS — E87.6 HYPOKALEMIA: ICD-10-CM

## 2023-10-23 DIAGNOSIS — I10 BENIGN ESSENTIAL HYPERTENSION: ICD-10-CM

## 2023-10-23 DIAGNOSIS — J30.9 ALLERGIC RHINITIS, UNSPECIFIED SEASONALITY, UNSPECIFIED TRIGGER: ICD-10-CM

## 2023-10-23 RX ORDER — POTASSIUM CHLORIDE 20 MEQ/1
20 TABLET, EXTENDED RELEASE ORAL DAILY
Qty: 90 TABLET | Refills: 3 | Status: SHIPPED | OUTPATIENT
Start: 2023-10-23

## 2023-10-23 RX ORDER — TELMISARTAN 80 MG/1
80 TABLET ORAL DAILY
Qty: 90 TABLET | Refills: 3 | Status: SHIPPED | OUTPATIENT
Start: 2023-10-23

## 2023-10-23 RX ORDER — AMLODIPINE BESYLATE 5 MG/1
5 TABLET ORAL DAILY
Qty: 90 TABLET | Refills: 3 | Status: SHIPPED | OUTPATIENT
Start: 2023-10-23

## 2023-10-23 RX ORDER — MONTELUKAST SODIUM 10 MG/1
10 TABLET ORAL NIGHTLY
Qty: 90 TABLET | Refills: 3 | Status: SHIPPED | OUTPATIENT
Start: 2023-10-23

## 2023-10-23 NOTE — TELEPHONE ENCOUNTER
Rx Refill Request Telephone Encounter    Name:  Kinsey Cao  :  [id number]  Medication Name:  Potassium  Dose : 20 meq  richard  Frequency : daily  Quantity : 90    Specific Pharmacy location:  [facility]   Date of last appointment:  Aug 2  Date of next appointment:  Dec 11  Best number to reach patient:  [phone]            Rx Refill Request Telephone Encounter    Name:  Kinsey Cao  :  [id number]  Medication Name:  Montelukast   Dose : 10 mg   oral  Frequency : dialy  Quantity : 90    Specific Pharmacy location:    Date of last appointment:    Date of next appointment:    Best number to reach patient:            Rx Refill Request Telephone Encounter    Name:  Kinsey Cao  :  [id number]  Medication Name:  telmisartan  80 mg  oral  Frequency : daily  Quantity : 90    Specific Pharmacy location:    Date of last appointment:    Date of next appointment:    Best number to reach patient:            Rx Refill Request Telephone Encounter    Name:  Kinsey Cao  :  [id number]  Medication Name:  Amlodipine  Dose : 5 mg  Route : oala  Frequency : daily  Quantity : 90    Specific Pharmacy location:    Date of last appointment:    Date of next appointment:    Best number to reach patient:

## 2023-11-02 NOTE — PROGRESS NOTES
"Kinsey Cao is a 58 y.o. female who is here for a routine exam. PCP = Laci Cooper MD  Family H/o breast cancer in 3 maternal aunts   No family H/o colon cancer   She had negative pap and HPV in 2020   She has a H/o uncontrolled diabetes, on insulin pump   APE Concerns: none    Other Concerns: none  Preventative:  Last mammogram: 11/2022   Last Colonoscopy: 2023, normal  LPAP 2020, normal   Seat Belt Use: always    GynHx:      Social History     Substance and Sexual Activity   Sexual Activity Yes    Partners: Male         SoHx:  Substance:   Tobacco Use: Low Risk  (11/3/2023)    Patient History     Smoking Tobacco Use: Never     Smokeless Tobacco Use: Never     Passive Exposure: Past      Social History     Substance and Sexual Activity   Drug Use Never      Social History     Substance and Sexual Activity   Alcohol Use Not Currently       Past Medical History:   Diagnosis Date    Delayed emergence from general anesthesia     Diabetes mellitus (CMS/HCC)     GERD (gastroesophageal reflux disease)     Hypertension     Personal history of other diseases of the circulatory system 10/25/2019    History of hypertension    Personal history of other diseases of the digestive system 10/25/2019    History of hiatal hernia    Personal history of other endocrine, nutritional and metabolic disease 10/25/2019    History of diabetes mellitus    Personal history of other medical treatment 10/25/2019    History of screening mammography      Past Surgical History:   Procedure Laterality Date    DENTAL IMPLANT      permanent bridge    OTHER SURGICAL HISTORY  10/25/2019    Breast surgery    OTHER SURGICAL HISTORY  10/25/2019    Shoulder surgery    OTHER SURGICAL HISTORY  10/25/2019    Hemorrhoidectomy      Objective   /80   Ht 1.702 m (5' 7\")   Wt 81.6 kg (180 lb)   Breastfeeding No   BMI 28.19 kg/m²      General:   Alert and oriented, in no acute distress   Neck: Supple. No visible thyromegaly.    Breast/Axilla: " Normal to palpation bilaterally without masses, skin changes, or nipple discharge.    Abdomen: Soft, non-tender, without masses or organomegaly   Vulva: Normal architecture without erythema, masses, or lesions.    Vagina: Normal mucosa without lesions, masses, or atrophy. No abnormal vaginal discharge.    Cervix: Normal without masses, lesions, or signs of cervicitis.    Uterus: Normal mobile, non-enlarged uterus    Adnexa: Normal without masses or lesions   Pelvic Floor No POP noted. No high tone pelvic floor    Psych Normal affect. Normal mood.      Problem List Items Addressed This Visit       Encounter for well woman exam with abnormal findings     Other Visit Diagnoses       Postmenopause atrophic vaginitis    -  Primary    Relevant Medications    estradiol (Vagifem) 10 mcg tablet vaginal tablet    Screening mammogram for breast cancer        Relevant Orders    BI mammo bilateral screening tomosynthesis           Assessment/Plan    Thank you for coming to your annual exam. Your findings during the exam were normal. Specific topics addressed during this exam included: healthy lifestyle, well woman screening guidelines,  Healthy diet with high fiber, Weight bearing exercise 3 to 5 times a week, Multivitamins and calcium     Actions performed during this visit include:  - Clinical breast exam  - Clinical pelvic exam  - mammogram ordered   Severe VVA, erx for yuvafem done    Please return for your next visit in 1 year.

## 2023-11-03 ENCOUNTER — OFFICE VISIT (OUTPATIENT)
Dept: OBSTETRICS AND GYNECOLOGY | Facility: CLINIC | Age: 58
End: 2023-11-03
Payer: COMMERCIAL

## 2023-11-03 VITALS
BODY MASS INDEX: 28.25 KG/M2 | WEIGHT: 180 LBS | HEIGHT: 67 IN | DIASTOLIC BLOOD PRESSURE: 80 MMHG | SYSTOLIC BLOOD PRESSURE: 130 MMHG

## 2023-11-03 DIAGNOSIS — N95.2 POSTMENOPAUSE ATROPHIC VAGINITIS: Primary | ICD-10-CM

## 2023-11-03 DIAGNOSIS — Z12.31 SCREENING MAMMOGRAM FOR BREAST CANCER: ICD-10-CM

## 2023-11-03 DIAGNOSIS — N89.8 VAGINAL DISCHARGE: ICD-10-CM

## 2023-11-03 DIAGNOSIS — Z01.411 ENCOUNTER FOR WELL WOMAN EXAM WITH ABNORMAL FINDINGS: ICD-10-CM

## 2023-11-03 PROCEDURE — 1036F TOBACCO NON-USER: CPT | Performed by: OBSTETRICS & GYNECOLOGY

## 2023-11-03 PROCEDURE — 4010F ACE/ARB THERAPY RXD/TAKEN: CPT | Performed by: OBSTETRICS & GYNECOLOGY

## 2023-11-03 PROCEDURE — 99396 PREV VISIT EST AGE 40-64: CPT | Performed by: OBSTETRICS & GYNECOLOGY

## 2023-11-03 PROCEDURE — 87205 SMEAR GRAM STAIN: CPT

## 2023-11-03 PROCEDURE — 3079F DIAST BP 80-89 MM HG: CPT | Performed by: OBSTETRICS & GYNECOLOGY

## 2023-11-03 PROCEDURE — 3075F SYST BP GE 130 - 139MM HG: CPT | Performed by: OBSTETRICS & GYNECOLOGY

## 2023-11-03 PROCEDURE — 3052F HG A1C>EQUAL 8.0%<EQUAL 9.0%: CPT | Performed by: OBSTETRICS & GYNECOLOGY

## 2023-11-03 RX ORDER — ESTRADIOL 10 UG/1
10 INSERT VAGINAL DAILY
Qty: 90 TABLET | Refills: 3 | Status: SHIPPED | OUTPATIENT
Start: 2023-11-03 | End: 2024-11-02

## 2023-11-03 RX ORDER — FAMOTIDINE 20 MG/1
20 TABLET, FILM COATED ORAL 2 TIMES DAILY
COMMUNITY
End: 2023-12-11 | Stop reason: WASHOUT

## 2023-11-04 LAB
CLUE CELLS VAG LPF-#/AREA: ABNORMAL /[LPF]
NUGENT SCORE: 1
YEAST VAG WET PREP-#/AREA: PRESENT

## 2023-11-06 DIAGNOSIS — B37.31 CANDIDAL VAGINITIS: Primary | ICD-10-CM

## 2023-11-06 RX ORDER — FLUCONAZOLE 150 MG/1
150 TABLET ORAL ONCE
Qty: 1 TABLET | Refills: 1 | Status: SHIPPED | OUTPATIENT
Start: 2023-11-06 | End: 2024-03-22

## 2023-11-07 ENCOUNTER — TELEPHONE (OUTPATIENT)
Dept: OBSTETRICS AND GYNECOLOGY | Facility: CLINIC | Age: 58
End: 2023-11-07
Payer: COMMERCIAL

## 2023-11-07 NOTE — TELEPHONE ENCOUNTER
----- Message from Ele Mcduffie MD sent at 11/6/2023  5:49 PM EST -----  Regarding: Yeast infection  Erx for fluconazole done  ----- Message -----  From: Lab, Background User  Sent: 11/4/2023   8:00 PM EST  To: Ele Mcduffie MD

## 2023-11-27 ENCOUNTER — HOSPITAL ENCOUNTER (OUTPATIENT)
Dept: RADIOLOGY | Facility: HOSPITAL | Age: 58
Discharge: HOME | End: 2023-11-27
Payer: COMMERCIAL

## 2023-11-27 DIAGNOSIS — Z12.31 SCREENING MAMMOGRAM FOR BREAST CANCER: ICD-10-CM

## 2023-11-27 PROCEDURE — 77063 BREAST TOMOSYNTHESIS BI: CPT | Performed by: RADIOLOGY

## 2023-11-27 PROCEDURE — 77067 SCR MAMMO BI INCL CAD: CPT

## 2023-11-27 PROCEDURE — 77067 SCR MAMMO BI INCL CAD: CPT | Performed by: RADIOLOGY

## 2023-12-04 ENCOUNTER — LAB (OUTPATIENT)
Dept: LAB | Facility: LAB | Age: 58
End: 2023-12-04
Payer: COMMERCIAL

## 2023-12-04 DIAGNOSIS — E10.65 TYPE 1 DIABETES MELLITUS WITH HYPERGLYCEMIA (MULTI): ICD-10-CM

## 2023-12-04 DIAGNOSIS — I10 BENIGN ESSENTIAL HYPERTENSION: ICD-10-CM

## 2023-12-04 DIAGNOSIS — E55.9 VITAMIN D DEFICIENCY: ICD-10-CM

## 2023-12-04 DIAGNOSIS — D50.9 IRON DEFICIENCY ANEMIA, UNSPECIFIED IRON DEFICIENCY ANEMIA TYPE: ICD-10-CM

## 2023-12-04 DIAGNOSIS — E78.2 MIXED HYPERLIPIDEMIA: ICD-10-CM

## 2023-12-04 LAB
25(OH)D3 SERPL-MCNC: 47 NG/ML (ref 30–100)
ALBUMIN SERPL BCP-MCNC: 4 G/DL (ref 3.4–5)
ALP SERPL-CCNC: 119 U/L (ref 33–110)
ALT SERPL W P-5'-P-CCNC: 13 U/L (ref 7–45)
ANION GAP SERPL CALC-SCNC: 15 MMOL/L (ref 10–20)
AST SERPL W P-5'-P-CCNC: 16 U/L (ref 9–39)
BILIRUB SERPL-MCNC: 0.4 MG/DL (ref 0–1.2)
BUN SERPL-MCNC: 7 MG/DL (ref 6–23)
CALCIUM SERPL-MCNC: 9.5 MG/DL (ref 8.6–10.3)
CHLORIDE SERPL-SCNC: 101 MMOL/L (ref 98–107)
CHOLEST SERPL-MCNC: 131 MG/DL (ref 0–199)
CHOLESTEROL/HDL RATIO: 2
CO2 SERPL-SCNC: 26 MMOL/L (ref 21–32)
CREAT SERPL-MCNC: 0.6 MG/DL (ref 0.5–1.05)
ERYTHROCYTE [DISTWIDTH] IN BLOOD BY AUTOMATED COUNT: 13.2 % (ref 11.5–14.5)
FERRITIN SERPL-MCNC: 24 NG/ML (ref 8–150)
GFR SERPL CREATININE-BSD FRML MDRD: >90 ML/MIN/1.73M*2
GLUCOSE SERPL-MCNC: 139 MG/DL (ref 74–99)
HCT VFR BLD AUTO: 36.9 % (ref 36–46)
HDLC SERPL-MCNC: 64.3 MG/DL
HGB BLD-MCNC: 11.8 G/DL (ref 12–16)
IRON SERPL-MCNC: 107 UG/DL (ref 35–150)
LDLC SERPL CALC-MCNC: 55 MG/DL
MCH RBC QN AUTO: 26.9 PG (ref 26–34)
MCHC RBC AUTO-ENTMCNC: 32 G/DL (ref 32–36)
MCV RBC AUTO: 84 FL (ref 80–100)
NON HDL CHOLESTEROL: 67 MG/DL (ref 0–149)
NRBC BLD-RTO: 0 /100 WBCS (ref 0–0)
PLATELET # BLD AUTO: 310 X10*3/UL (ref 150–450)
POTASSIUM SERPL-SCNC: 3.9 MMOL/L (ref 3.5–5.3)
PROT SERPL-MCNC: 7.1 G/DL (ref 6.4–8.2)
RBC # BLD AUTO: 4.38 X10*6/UL (ref 4–5.2)
SODIUM SERPL-SCNC: 138 MMOL/L (ref 136–145)
TRIGL SERPL-MCNC: 58 MG/DL (ref 0–149)
VLDL: 12 MG/DL (ref 0–40)
WBC # BLD AUTO: 4.4 X10*3/UL (ref 4.4–11.3)

## 2023-12-04 PROCEDURE — 36415 COLL VENOUS BLD VENIPUNCTURE: CPT

## 2023-12-04 PROCEDURE — 83036 HEMOGLOBIN GLYCOSYLATED A1C: CPT

## 2023-12-04 PROCEDURE — 80061 LIPID PANEL: CPT

## 2023-12-04 PROCEDURE — 82728 ASSAY OF FERRITIN: CPT

## 2023-12-04 PROCEDURE — 85027 COMPLETE CBC AUTOMATED: CPT

## 2023-12-04 PROCEDURE — 83540 ASSAY OF IRON: CPT

## 2023-12-04 PROCEDURE — 80053 COMPREHEN METABOLIC PANEL: CPT

## 2023-12-04 PROCEDURE — 82306 VITAMIN D 25 HYDROXY: CPT

## 2023-12-05 LAB
EST. AVERAGE GLUCOSE BLD GHB EST-MCNC: 189 MG/DL
HBA1C MFR BLD: 8.2 %

## 2023-12-11 ENCOUNTER — OFFICE VISIT (OUTPATIENT)
Dept: PRIMARY CARE | Facility: CLINIC | Age: 58
End: 2023-12-11
Payer: COMMERCIAL

## 2023-12-11 VITALS
RESPIRATION RATE: 14 BRPM | DIASTOLIC BLOOD PRESSURE: 81 MMHG | OXYGEN SATURATION: 98 % | HEART RATE: 77 BPM | WEIGHT: 179 LBS | BODY MASS INDEX: 28.04 KG/M2 | SYSTOLIC BLOOD PRESSURE: 137 MMHG | TEMPERATURE: 96.6 F

## 2023-12-11 DIAGNOSIS — E55.9 VITAMIN D DEFICIENCY: ICD-10-CM

## 2023-12-11 DIAGNOSIS — E78.2 MIXED HYPERLIPIDEMIA: ICD-10-CM

## 2023-12-11 DIAGNOSIS — F41.1 GENERALIZED ANXIETY DISORDER: ICD-10-CM

## 2023-12-11 DIAGNOSIS — D50.9 IRON DEFICIENCY ANEMIA, UNSPECIFIED IRON DEFICIENCY ANEMIA TYPE: ICD-10-CM

## 2023-12-11 DIAGNOSIS — I10 BENIGN ESSENTIAL HYPERTENSION: ICD-10-CM

## 2023-12-11 DIAGNOSIS — E10.65 TYPE 1 DIABETES MELLITUS WITH HYPERGLYCEMIA (MULTI): ICD-10-CM

## 2023-12-11 DIAGNOSIS — K21.9 CHRONIC GERD: ICD-10-CM

## 2023-12-11 DIAGNOSIS — F32.1 DEPRESSION, MAJOR, SINGLE EPISODE, MODERATE (MULTI): Primary | ICD-10-CM

## 2023-12-11 DIAGNOSIS — R19.01 ABDOMINAL WALL MASS OF RIGHT UPPER QUADRANT: ICD-10-CM

## 2023-12-11 DIAGNOSIS — K31.7 GASTRIC POLYP: ICD-10-CM

## 2023-12-11 PROBLEM — H69.93 DYSFUNCTION OF BOTH EUSTACHIAN TUBES: Status: RESOLVED | Noted: 2023-02-01 | Resolved: 2023-12-11

## 2023-12-11 PROBLEM — R20.2 TINGLING: Status: RESOLVED | Noted: 2023-02-01 | Resolved: 2023-12-11

## 2023-12-11 PROBLEM — H92.01 RIGHT EAR PAIN: Status: RESOLVED | Noted: 2023-02-01 | Resolved: 2023-12-11

## 2023-12-11 PROCEDURE — 3052F HG A1C>EQUAL 8.0%<EQUAL 9.0%: CPT | Performed by: FAMILY MEDICINE

## 2023-12-11 PROCEDURE — 3048F LDL-C <100 MG/DL: CPT | Performed by: FAMILY MEDICINE

## 2023-12-11 PROCEDURE — 3079F DIAST BP 80-89 MM HG: CPT | Performed by: FAMILY MEDICINE

## 2023-12-11 PROCEDURE — 4010F ACE/ARB THERAPY RXD/TAKEN: CPT | Performed by: FAMILY MEDICINE

## 2023-12-11 PROCEDURE — 1036F TOBACCO NON-USER: CPT | Performed by: FAMILY MEDICINE

## 2023-12-11 PROCEDURE — 3075F SYST BP GE 130 - 139MM HG: CPT | Performed by: FAMILY MEDICINE

## 2023-12-11 PROCEDURE — 99214 OFFICE O/P EST MOD 30 MIN: CPT | Performed by: FAMILY MEDICINE

## 2023-12-11 RX ORDER — IPRATROPIUM BROMIDE 42 UG/1
SPRAY, METERED NASAL
COMMUNITY

## 2023-12-11 RX ORDER — CLONAZEPAM 0.5 MG/1
TABLET ORAL
Qty: 45 TABLET | Refills: 2 | Status: SHIPPED | OUTPATIENT
Start: 2023-12-11 | End: 2024-04-18 | Stop reason: SDUPTHER

## 2023-12-11 ASSESSMENT — ENCOUNTER SYMPTOMS
ABDOMINAL PAIN: 1
CHILLS: 0
CONFUSION: 0
CHEST TIGHTNESS: 0
ROS GI COMMENTS: ACID REFLUX
ARTHRALGIAS: 0
PALPITATIONS: 0
SHORTNESS OF BREATH: 0
FEVER: 0

## 2023-12-11 NOTE — PROGRESS NOTES
Subjective   Patient ID: Kinsey Cao is a 58 y.o. female who presents for Follow-up (4 month).    HPI   Patient today for follow-up of ongoing healthcare issues and review of blood work for most part states she is doing well.  She has been having some issues with exacerbation of her acid reflux and some right upper quadrant abdominal discomfort.  She saw her general surgeon Dr. Alves had an EGD done which showed several gastric polyps he recommended that she stop the Pepcid and Nexium.  She tried Pepcid AC and Zantac without relief of her acid reflux.  She also has some right upper quadrant abdominal discomfort.  Has an appointment to see him this afternoon for reevaluation.  Review of Systems   Constitutional:  Negative for chills and fever.   HENT:  Negative for congestion and ear pain.    Eyes:  Negative for visual disturbance.   Respiratory:  Negative for chest tightness and shortness of breath.    Cardiovascular:  Negative for chest pain and palpitations.   Gastrointestinal:  Positive for abdominal pain.        Acid reflux   Musculoskeletal:  Negative for arthralgias.   Skin:  Negative for pallor.   Psychiatric/Behavioral:  Negative for confusion.        Objective   /81   Pulse 77   Temp 35.9 °C (96.6 °F)   Resp 14   Wt 81.2 kg (179 lb)   SpO2 98%   BMI 28.04 kg/m²     Physical Exam  Vitals and nursing note reviewed.   Constitutional:       General: She is not in acute distress.     Appearance: Normal appearance. She is not ill-appearing.   HENT:      Head: Normocephalic and atraumatic.      Right Ear: Tympanic membrane, ear canal and external ear normal.      Left Ear: Tympanic membrane, ear canal and external ear normal.      Mouth/Throat:      Pharynx: Oropharynx is clear.   Eyes:      Extraocular Movements: Extraocular movements intact.   Cardiovascular:      Rate and Rhythm: Normal rate and regular rhythm.      Pulses: Normal pulses.      Heart sounds: Normal heart sounds.   Pulmonary:       Effort: Pulmonary effort is normal.      Breath sounds: Normal breath sounds.   Abdominal:      General: Abdomen is flat. Bowel sounds are normal.      Palpations: Abdomen is soft. There is mass.      Tenderness: There is abdominal tenderness. There is rebound. There is no guarding.      Comments: Mild right upper quadrant tenderness positive for right upper quadrant subcutaneous soft tissue mass.   Musculoskeletal:         General: Normal range of motion.      Cervical back: Neck supple.   Skin:     General: Skin is warm.   Neurological:      Mental Status: She is alert and oriented to person, place, and time. Mental status is at baseline.   Psychiatric:         Mood and Affect: Mood normal.       Recent Results (from the past 1008 hour(s))   Vaginitis Gram Stain For Bacterial Vaginosis + Yeast    Collection Time: 11/03/23 11:33 AM    Specimen: Vaginal; Swab   Result Value Ref Range    Louis Score 1 0 - 3    Yeast PRESENT (A) ABSENT    Clue Cells ABSENT ABSENT   CBC    Collection Time: 12/04/23 10:48 AM   Result Value Ref Range    WBC 4.4 4.4 - 11.3 x10*3/uL    nRBC 0.0 0.0 - 0.0 /100 WBCs    RBC 4.38 4.00 - 5.20 x10*6/uL    Hemoglobin 11.8 (L) 12.0 - 16.0 g/dL    Hematocrit 36.9 36.0 - 46.0 %    MCV 84 80 - 100 fL    MCH 26.9 26.0 - 34.0 pg    MCHC 32.0 32.0 - 36.0 g/dL    RDW 13.2 11.5 - 14.5 %    Platelets 310 150 - 450 x10*3/uL   Comprehensive Metabolic Panel    Collection Time: 12/04/23 10:48 AM   Result Value Ref Range    Glucose 139 (H) 74 - 99 mg/dL    Sodium 138 136 - 145 mmol/L    Potassium 3.9 3.5 - 5.3 mmol/L    Chloride 101 98 - 107 mmol/L    Bicarbonate 26 21 - 32 mmol/L    Anion Gap 15 10 - 20 mmol/L    Urea Nitrogen 7 6 - 23 mg/dL    Creatinine 0.60 0.50 - 1.05 mg/dL    eGFR >90 >60 mL/min/1.73m*2    Calcium 9.5 8.6 - 10.3 mg/dL    Albumin 4.0 3.4 - 5.0 g/dL    Alkaline Phosphatase 119 (H) 33 - 110 U/L    Total Protein 7.1 6.4 - 8.2 g/dL    AST 16 9 - 39 U/L    Bilirubin, Total 0.4 0.0 - 1.2  mg/dL    ALT 13 7 - 45 U/L   Ferritin    Collection Time: 12/04/23 10:48 AM   Result Value Ref Range    Ferritin 24 8 - 150 ng/mL   Hemoglobin A1C    Collection Time: 12/04/23 10:48 AM   Result Value Ref Range    Hemoglobin A1C 8.2 (H) see below %    Estimated Average Glucose 189 Not Established mg/dL   Iron    Collection Time: 12/04/23 10:48 AM   Result Value Ref Range    Iron 107 35 - 150 ug/dL   Lipid Panel    Collection Time: 12/04/23 10:48 AM   Result Value Ref Range    Cholesterol 131 0 - 199 mg/dL    HDL-Cholesterol 64.3 mg/dL    Cholesterol/HDL Ratio 2.0     LDL Calculated 55 <=99 mg/dL    VLDL 12 0 - 40 mg/dL    Triglycerides 58 0 - 149 mg/dL    Non HDL Cholesterol 67 0 - 149 mg/dL   Vitamin D, Total    Collection Time: 12/04/23 10:48 AM   Result Value Ref Range    Vitamin D, 25-Hydroxy, Total 47 30 - 100 ng/mL     Recent labs reviewed with patient continue current medications    Follow-up appointment with her general surgeon this afternoon for evaluation of her GI/abdominal symptoms    Call if any questions or concerns otherwise return to our office 4 months with repeat fasting labs    Assessment/Plan   Problem List Items Addressed This Visit             ICD-10-CM    Benign essential hypertension I10     Stable continue current treatment         Relevant Orders    CBC    Comprehensive Metabolic Panel    Follow Up In Primary Care - Established    Chronic GERD K21.9     Recent EGD reviewed with patient she will see general surgeon today to discuss treatment options given recent history of gastric polyps         Depression, major, single episode, moderate (CMS/HCC) - Primary F32.1     Stable continue current treatment         Relevant Orders    Follow Up In Primary Care - Established    Gastric polyp K31.7     Noted on recent EGD follow-up with general surgeon         Generalized anxiety disorder F41.1     Refill clonazepam    I have personally reviewed the OARRS report.  I have considered the risks of  abuse,dependence, addiction and diversion.  I believe it is clinically appropriate to prescribe this medication.            Relevant Medications    clonazePAM (KlonoPIN) 0.5 mg tablet    Hyperlipidemia E78.5     Stable continue current treatment and dietary modification         Relevant Orders    Cholesterol, LDL Direct    Comprehensive Metabolic Panel    Lipid Panel    Follow Up In Primary Care - Established    Iron deficiency anemia D50.9     Stable continue to monitor supplement as needed         Relevant Orders    CBC    Ferritin    Iron    Follow Up In Primary Care - Established    Type 1 diabetes mellitus (CMS/HCC) E10.9     A1c 8.2% continue current treatment follow with endocrinology    Keep yearly diabetic eye exams with ophthalmology.         Relevant Orders    Comprehensive Metabolic Panel    Hemoglobin A1C    Follow Up In Primary Care - Established    Vitamin D deficiency E55.9     Stable continue to monitor supplement as needed         Abdominal wall mass of right upper quadrant R19.01     Possible lipoma patient has appointment to see general surgeon this afternoon

## 2023-12-11 NOTE — ASSESSMENT & PLAN NOTE
A1c 8.2% continue current treatment follow with endocrinology    Keep yearly diabetic eye exams with ophthalmology.

## 2023-12-11 NOTE — ASSESSMENT & PLAN NOTE
Recent EGD reviewed with patient she will see general surgeon today to discuss treatment options given recent history of gastric polyps

## 2023-12-11 NOTE — ASSESSMENT & PLAN NOTE
Refill clonazepam    I have personally reviewed the OARRS report.  I have considered the risks of abuse,dependence, addiction and diversion.  I believe it is clinically appropriate to prescribe this medication.

## 2023-12-15 ENCOUNTER — HOSPITAL ENCOUNTER (OUTPATIENT)
Dept: RADIOLOGY | Facility: HOSPITAL | Age: 58
Discharge: HOME | End: 2023-12-15
Payer: COMMERCIAL

## 2023-12-15 DIAGNOSIS — R11.0 NAUSEA: ICD-10-CM

## 2023-12-15 DIAGNOSIS — K21.9 CHRONIC GERD: ICD-10-CM

## 2023-12-15 PROCEDURE — 76705 ECHO EXAM OF ABDOMEN: CPT

## 2023-12-15 PROCEDURE — 76705 ECHO EXAM OF ABDOMEN: CPT | Performed by: RADIOLOGY

## 2024-01-03 ENCOUNTER — HOSPITAL ENCOUNTER (OUTPATIENT)
Dept: RADIOLOGY | Facility: HOSPITAL | Age: 59
Discharge: HOME | End: 2024-01-03
Payer: COMMERCIAL

## 2024-01-03 VITALS — BODY MASS INDEX: 28.19 KG/M2 | WEIGHT: 180 LBS

## 2024-01-03 DIAGNOSIS — R11.0 NAUSEA: ICD-10-CM

## 2024-01-03 DIAGNOSIS — K21.9 GERD WITHOUT ESOPHAGITIS: ICD-10-CM

## 2024-01-03 PROCEDURE — 78227 HEPATOBIL SYST IMAGE W/DRUG: CPT | Performed by: STUDENT IN AN ORGANIZED HEALTH CARE EDUCATION/TRAINING PROGRAM

## 2024-01-03 PROCEDURE — 3430000001 HC RX 343 DIAGNOSTIC RADIOPHARMACEUTICALS: Performed by: STUDENT IN AN ORGANIZED HEALTH CARE EDUCATION/TRAINING PROGRAM

## 2024-01-03 PROCEDURE — A9537 TC99M MEBROFENIN: HCPCS | Performed by: STUDENT IN AN ORGANIZED HEALTH CARE EDUCATION/TRAINING PROGRAM

## 2024-01-03 PROCEDURE — 78227 HEPATOBIL SYST IMAGE W/DRUG: CPT

## 2024-01-03 RX ORDER — SINCALIDE 5 UG/5ML
1.6 INJECTION, POWDER, LYOPHILIZED, FOR SOLUTION INTRAVENOUS ONCE
Status: DISCONTINUED | OUTPATIENT
Start: 2024-01-03 | End: 2024-01-04 | Stop reason: HOSPADM

## 2024-01-03 RX ORDER — KIT FOR THE PREPARATION OF TECHNETIUM TC 99M MEBROFENIN 45 MG/10ML
5 INJECTION, POWDER, LYOPHILIZED, FOR SOLUTION INTRAVENOUS
Status: COMPLETED | OUTPATIENT
Start: 2024-01-03 | End: 2024-01-03

## 2024-01-03 RX ADMIN — KIT FOR THE PREPARATION OF TECHNETIUM TC 99M MEBROFENIN 5 MILLICURIE: 45 INJECTION, POWDER, LYOPHILIZED, FOR SOLUTION INTRAVENOUS at 08:50

## 2024-01-30 ENCOUNTER — HOSPITAL ENCOUNTER (OUTPATIENT)
Dept: RADIOLOGY | Facility: HOSPITAL | Age: 59
Discharge: HOME | End: 2024-01-30
Payer: COMMERCIAL

## 2024-01-30 DIAGNOSIS — R10.84 GENERALIZED ABDOMINAL PAIN: ICD-10-CM

## 2024-01-30 DIAGNOSIS — R11.0 NAUSEA: ICD-10-CM

## 2024-01-30 DIAGNOSIS — R14.0 BLOATING: ICD-10-CM

## 2024-01-30 PROCEDURE — 3430000001 HC RX 343 DIAGNOSTIC RADIOPHARMACEUTICALS: Performed by: STUDENT IN AN ORGANIZED HEALTH CARE EDUCATION/TRAINING PROGRAM

## 2024-01-30 PROCEDURE — 78264 GASTRIC EMPTYING IMG STUDY: CPT

## 2024-01-30 PROCEDURE — 78264 GASTRIC EMPTYING IMG STUDY: CPT | Performed by: STUDENT IN AN ORGANIZED HEALTH CARE EDUCATION/TRAINING PROGRAM

## 2024-01-30 RX ADMIN — Medication 1 MILLICURIE: at 08:30

## 2024-03-08 DIAGNOSIS — B37.31 CANDIDAL VAGINITIS: Primary | ICD-10-CM

## 2024-03-22 RX ORDER — FLUCONAZOLE 150 MG/1
150 TABLET ORAL ONCE
Qty: 1 TABLET | Refills: 1 | Status: SHIPPED | OUTPATIENT
Start: 2024-03-22 | End: 2024-03-22

## 2024-04-01 ENCOUNTER — LAB (OUTPATIENT)
Dept: LAB | Facility: LAB | Age: 59
End: 2024-04-01
Payer: COMMERCIAL

## 2024-04-01 DIAGNOSIS — D50.9 IRON DEFICIENCY ANEMIA, UNSPECIFIED IRON DEFICIENCY ANEMIA TYPE: ICD-10-CM

## 2024-04-01 DIAGNOSIS — E78.2 MIXED HYPERLIPIDEMIA: ICD-10-CM

## 2024-04-01 DIAGNOSIS — I10 BENIGN ESSENTIAL HYPERTENSION: ICD-10-CM

## 2024-04-01 DIAGNOSIS — E10.65 TYPE 1 DIABETES MELLITUS WITH HYPERGLYCEMIA (MULTI): ICD-10-CM

## 2024-04-01 LAB
ALBUMIN SERPL BCP-MCNC: 3.8 G/DL (ref 3.4–5)
ALP SERPL-CCNC: 116 U/L (ref 33–110)
ALT SERPL W P-5'-P-CCNC: 18 U/L (ref 7–45)
ANION GAP SERPL CALC-SCNC: 9 MMOL/L (ref 10–20)
AST SERPL W P-5'-P-CCNC: 18 U/L (ref 9–39)
BILIRUB SERPL-MCNC: 0.3 MG/DL (ref 0–1.2)
BUN SERPL-MCNC: 13 MG/DL (ref 6–23)
CALCIUM SERPL-MCNC: 8.8 MG/DL (ref 8.6–10.3)
CHLORIDE SERPL-SCNC: 99 MMOL/L (ref 98–107)
CHOLEST SERPL-MCNC: 141 MG/DL (ref 0–199)
CHOLESTEROL/HDL RATIO: 2.6
CO2 SERPL-SCNC: 31 MMOL/L (ref 21–32)
CREAT SERPL-MCNC: 0.69 MG/DL (ref 0.5–1.05)
EGFRCR SERPLBLD CKD-EPI 2021: >90 ML/MIN/1.73M*2
ERYTHROCYTE [DISTWIDTH] IN BLOOD BY AUTOMATED COUNT: 13.2 % (ref 11.5–14.5)
EST. AVERAGE GLUCOSE BLD GHB EST-MCNC: 212 MG/DL
FERRITIN SERPL-MCNC: 24 NG/ML (ref 8–150)
GLUCOSE SERPL-MCNC: 201 MG/DL (ref 74–99)
HBA1C MFR BLD: 9 %
HCT VFR BLD AUTO: 35.7 % (ref 36–46)
HDLC SERPL-MCNC: 54.7 MG/DL
HGB BLD-MCNC: 11.8 G/DL (ref 12–16)
IRON SERPL-MCNC: 46 UG/DL (ref 35–150)
LDLC SERPL CALC-MCNC: 72 MG/DL
LDLC SERPL DIRECT ASSAY-MCNC: 64 MG/DL (ref 0–129)
MCH RBC QN AUTO: 27.6 PG (ref 26–34)
MCHC RBC AUTO-ENTMCNC: 33.1 G/DL (ref 32–36)
MCV RBC AUTO: 84 FL (ref 80–100)
NON HDL CHOLESTEROL: 86 MG/DL (ref 0–149)
NRBC BLD-RTO: 0 /100 WBCS (ref 0–0)
PLATELET # BLD AUTO: 276 X10*3/UL (ref 150–450)
POTASSIUM SERPL-SCNC: 3.9 MMOL/L (ref 3.5–5.3)
PROT SERPL-MCNC: 6.6 G/DL (ref 6.4–8.2)
RBC # BLD AUTO: 4.27 X10*6/UL (ref 4–5.2)
SODIUM SERPL-SCNC: 135 MMOL/L (ref 136–145)
TRIGL SERPL-MCNC: 72 MG/DL (ref 0–149)
VLDL: 14 MG/DL (ref 0–40)
WBC # BLD AUTO: 4.8 X10*3/UL (ref 4.4–11.3)

## 2024-04-01 PROCEDURE — 82728 ASSAY OF FERRITIN: CPT

## 2024-04-01 PROCEDURE — 83721 ASSAY OF BLOOD LIPOPROTEIN: CPT

## 2024-04-01 PROCEDURE — 83036 HEMOGLOBIN GLYCOSYLATED A1C: CPT

## 2024-04-01 PROCEDURE — 83540 ASSAY OF IRON: CPT

## 2024-04-01 PROCEDURE — 85027 COMPLETE CBC AUTOMATED: CPT

## 2024-04-01 PROCEDURE — 80061 LIPID PANEL: CPT

## 2024-04-01 PROCEDURE — 36415 COLL VENOUS BLD VENIPUNCTURE: CPT

## 2024-04-01 PROCEDURE — 80053 COMPREHEN METABOLIC PANEL: CPT

## 2024-04-08 ENCOUNTER — OFFICE VISIT (OUTPATIENT)
Dept: PRIMARY CARE | Facility: CLINIC | Age: 59
End: 2024-04-08
Payer: COMMERCIAL

## 2024-04-08 VITALS
DIASTOLIC BLOOD PRESSURE: 75 MMHG | HEART RATE: 67 BPM | TEMPERATURE: 97.3 F | OXYGEN SATURATION: 97 % | WEIGHT: 183 LBS | RESPIRATION RATE: 14 BRPM | SYSTOLIC BLOOD PRESSURE: 121 MMHG | BODY MASS INDEX: 28.66 KG/M2

## 2024-04-08 DIAGNOSIS — E55.9 VITAMIN D DEFICIENCY: ICD-10-CM

## 2024-04-08 DIAGNOSIS — I10 BENIGN ESSENTIAL HYPERTENSION: ICD-10-CM

## 2024-04-08 DIAGNOSIS — E78.2 MIXED HYPERLIPIDEMIA: ICD-10-CM

## 2024-04-08 DIAGNOSIS — E10.65 TYPE 1 DIABETES MELLITUS WITH HYPERGLYCEMIA (MULTI): Primary | ICD-10-CM

## 2024-04-08 DIAGNOSIS — Z13.29 THYROID DISORDER SCREEN: ICD-10-CM

## 2024-04-08 DIAGNOSIS — F32.1 DEPRESSION, MAJOR, SINGLE EPISODE, MODERATE (MULTI): ICD-10-CM

## 2024-04-08 DIAGNOSIS — D50.9 IRON DEFICIENCY ANEMIA, UNSPECIFIED IRON DEFICIENCY ANEMIA TYPE: ICD-10-CM

## 2024-04-08 PROBLEM — H81.10 BENIGN PAROXYSMAL POSITIONAL VERTIGO: Status: RESOLVED | Noted: 2023-02-01 | Resolved: 2024-04-08

## 2024-04-08 PROBLEM — R42 DIZZINESS: Status: RESOLVED | Noted: 2023-02-01 | Resolved: 2024-04-08

## 2024-04-08 PROCEDURE — 3052F HG A1C>EQUAL 8.0%<EQUAL 9.0%: CPT | Performed by: FAMILY MEDICINE

## 2024-04-08 PROCEDURE — 4010F ACE/ARB THERAPY RXD/TAKEN: CPT | Performed by: FAMILY MEDICINE

## 2024-04-08 PROCEDURE — 3048F LDL-C <100 MG/DL: CPT | Performed by: FAMILY MEDICINE

## 2024-04-08 PROCEDURE — 1036F TOBACCO NON-USER: CPT | Performed by: FAMILY MEDICINE

## 2024-04-08 PROCEDURE — 3074F SYST BP LT 130 MM HG: CPT | Performed by: FAMILY MEDICINE

## 2024-04-08 PROCEDURE — 3078F DIAST BP <80 MM HG: CPT | Performed by: FAMILY MEDICINE

## 2024-04-08 PROCEDURE — 99214 OFFICE O/P EST MOD 30 MIN: CPT | Performed by: FAMILY MEDICINE

## 2024-04-08 RX ORDER — FLUOXETINE HYDROCHLORIDE 40 MG/1
40 CAPSULE ORAL DAILY
Qty: 90 CAPSULE | Refills: 3 | Status: SHIPPED | OUTPATIENT
Start: 2024-04-08 | End: 2025-04-08

## 2024-04-08 RX ORDER — CARVEDILOL 12.5 MG/1
12.5 TABLET ORAL 2 TIMES DAILY
Qty: 180 TABLET | Refills: 3 | Status: SHIPPED | OUTPATIENT
Start: 2024-04-08 | End: 2025-04-08

## 2024-04-08 RX ORDER — HYDROCHLOROTHIAZIDE 25 MG/1
25 TABLET ORAL DAILY
Qty: 90 TABLET | Refills: 3 | Status: SHIPPED | OUTPATIENT
Start: 2024-04-08

## 2024-04-08 NOTE — ASSESSMENT & PLAN NOTE
A1c is gone up to 9.0% she continues to work with her endocrinologist she is not having issues with her sensor and her insulin pump.

## 2024-04-08 NOTE — PROGRESS NOTES
Subjective   Patient ID: Kinsey Cao is a 58 y.o. female who presents for Follow-up (4 month).    HPI patient today for follow-up of ongoing healthcare issues and review of lab work she says has been having issues with her insulin pump and sensor and she is working with her endocrinologist to try to remedy the situation.  As result she feels her sugars are not under as good control.  She also admits that she has not been always dosing appropriately given her busy work schedule.    Review of Systems    Objective   /75   Pulse 67   Temp 36.3 °C (97.3 °F)   Resp 14   Wt 83 kg (183 lb)   SpO2 97%   BMI 28.66 kg/m²     Physical Exam  Vitals and nursing note reviewed.   Constitutional:       General: She is not in acute distress.     Appearance: Normal appearance. She is not ill-appearing.   HENT:      Head: Normocephalic and atraumatic.      Right Ear: Tympanic membrane, ear canal and external ear normal.      Left Ear: Tympanic membrane, ear canal and external ear normal.      Mouth/Throat:      Pharynx: Oropharynx is clear.   Eyes:      Extraocular Movements: Extraocular movements intact.   Cardiovascular:      Rate and Rhythm: Normal rate and regular rhythm.      Pulses: Normal pulses.      Heart sounds: Normal heart sounds.   Pulmonary:      Effort: Pulmonary effort is normal.      Breath sounds: Normal breath sounds.   Abdominal:      General: Abdomen is flat. Bowel sounds are normal.      Palpations: Abdomen is soft.      Tenderness: There is no abdominal tenderness.   Musculoskeletal:         General: Normal range of motion.      Cervical back: Neck supple.   Skin:     General: Skin is warm.   Neurological:      Mental Status: She is alert and oriented to person, place, and time. Mental status is at baseline.   Psychiatric:         Mood and Affect: Mood normal.       Recent Results (from the past 1008 hour(s))   CBC    Collection Time: 04/01/24  8:49 AM   Result Value Ref Range    WBC 4.8 4.4 -  11.3 x10*3/uL    nRBC 0.0 0.0 - 0.0 /100 WBCs    RBC 4.27 4.00 - 5.20 x10*6/uL    Hemoglobin 11.8 (L) 12.0 - 16.0 g/dL    Hematocrit 35.7 (L) 36.0 - 46.0 %    MCV 84 80 - 100 fL    MCH 27.6 26.0 - 34.0 pg    MCHC 33.1 32.0 - 36.0 g/dL    RDW 13.2 11.5 - 14.5 %    Platelets 276 150 - 450 x10*3/uL   Cholesterol, LDL Direct    Collection Time: 04/01/24  8:49 AM   Result Value Ref Range    LDL, Direct 64 0 - 129 mg/dL   Comprehensive Metabolic Panel    Collection Time: 04/01/24  8:49 AM   Result Value Ref Range    Glucose 201 (H) 74 - 99 mg/dL    Sodium 135 (L) 136 - 145 mmol/L    Potassium 3.9 3.5 - 5.3 mmol/L    Chloride 99 98 - 107 mmol/L    Bicarbonate 31 21 - 32 mmol/L    Anion Gap 9 (L) 10 - 20 mmol/L    Urea Nitrogen 13 6 - 23 mg/dL    Creatinine 0.69 0.50 - 1.05 mg/dL    eGFR >90 >60 mL/min/1.73m*2    Calcium 8.8 8.6 - 10.3 mg/dL    Albumin 3.8 3.4 - 5.0 g/dL    Alkaline Phosphatase 116 (H) 33 - 110 U/L    Total Protein 6.6 6.4 - 8.2 g/dL    AST 18 9 - 39 U/L    Bilirubin, Total 0.3 0.0 - 1.2 mg/dL    ALT 18 7 - 45 U/L   Hemoglobin A1C    Collection Time: 04/01/24  8:49 AM   Result Value Ref Range    Hemoglobin A1C 9.0 (H) see below %    Estimated Average Glucose 212 Not Established mg/dL   Ferritin    Collection Time: 04/01/24  8:49 AM   Result Value Ref Range    Ferritin 24 8 - 150 ng/mL   Iron    Collection Time: 04/01/24  8:49 AM   Result Value Ref Range    Iron 46 35 - 150 ug/dL   Lipid Panel    Collection Time: 04/01/24  8:49 AM   Result Value Ref Range    Cholesterol 141 0 - 199 mg/dL    HDL-Cholesterol 54.7 mg/dL    Cholesterol/HDL Ratio 2.6     LDL Calculated 72 <=99 mg/dL    VLDL 14 0 - 40 mg/dL    Triglycerides 72 0 - 149 mg/dL    Non HDL Cholesterol 86 0 - 149 mg/dL     Recent labs reviewed continue current medications follow with endocrinology encourage better compliance with medication and diet    Keep yearly diabetic eye exams    Return to office 4 months with repeat fasting  labs    Assessment/Plan   Problem List Items Addressed This Visit             ICD-10-CM    Benign essential hypertension I10     Stable continue current medication         Relevant Medications    carvedilol (Coreg) 12.5 mg tablet    hydroCHLOROthiazide (HYDRODiuril) 25 mg tablet    Other Relevant Orders    CBC    Comprehensive Metabolic Panel    Vitamin B12    Follow Up In Primary Care - Established    Depression, major, single episode, moderate (CMS/HCC) F32.1     Stable continue fluoxetine 40 mg daily         Relevant Medications    FLUoxetine (PROzac) 40 mg capsule    Other Relevant Orders    Vitamin B12    Follow Up In Primary Care - Established    Hyperlipidemia E78.5     Stable continue Crestor 10 mg daily along with dietary modification         Relevant Orders    Comprehensive Metabolic Panel    Lipid Panel    Follow Up In Primary Care - Established    Iron deficiency anemia D50.9     Stable continue to monitor supplement as needed         Relevant Orders    CBC    Ferritin    Iron    Vitamin B12    Follow Up In Primary Care - Established    Type 1 diabetes mellitus (CMS/[facility]) - Primary E10.9     A1c is gone up to 9.0% she continues to work with her endocrinologist she is not having issues with her sensor and her insulin pump.         Relevant Orders    Comprehensive Metabolic Panel    Hemoglobin A1C    Albumin , Urine Random    Follow Up In Primary Care - Established    Vitamin D deficiency E55.9     Continue to monitor and supplement as needed         Relevant Orders    Vitamin D 25-Hydroxy,Total (for eval of Vitamin D levels)    Thyroid disorder screen Z13.29     TSH with reflex         Relevant Orders    TSH with reflex to Free T4 if abnormal

## 2024-04-18 ENCOUNTER — TELEPHONE (OUTPATIENT)
Dept: PRIMARY CARE | Facility: CLINIC | Age: 59
End: 2024-04-18
Payer: COMMERCIAL

## 2024-04-18 DIAGNOSIS — F41.1 GENERALIZED ANXIETY DISORDER: ICD-10-CM

## 2024-04-18 RX ORDER — CLONAZEPAM 0.5 MG/1
TABLET ORAL
Qty: 45 TABLET | Refills: 2 | Status: SHIPPED | OUTPATIENT
Start: 2024-04-18

## 2024-04-18 NOTE — TELEPHONE ENCOUNTER
Rx Refill Request Telephone Encounter    Name:  Kinsey Cao  :  [id number]  Medication Name:    clonazePAM (KlonoPIN) 0.5 mg tablet        Half a tab in the a.m. and 1 pill at bedtime as needed anxiety       Specific Pharmacy location:  [facility] [address]  Date of last appointment:    Date of next appointment:    Best number to reach patient:  [phone]

## 2024-07-31 ENCOUNTER — LAB (OUTPATIENT)
Dept: LAB | Facility: LAB | Age: 59
End: 2024-07-31
Payer: COMMERCIAL

## 2024-07-31 DIAGNOSIS — E55.9 VITAMIN D DEFICIENCY: ICD-10-CM

## 2024-07-31 DIAGNOSIS — E10.65 TYPE 1 DIABETES MELLITUS WITH HYPERGLYCEMIA (MULTI): ICD-10-CM

## 2024-07-31 DIAGNOSIS — E78.2 MIXED HYPERLIPIDEMIA: ICD-10-CM

## 2024-07-31 DIAGNOSIS — Z13.29 THYROID DISORDER SCREEN: ICD-10-CM

## 2024-07-31 DIAGNOSIS — I10 BENIGN ESSENTIAL HYPERTENSION: ICD-10-CM

## 2024-07-31 DIAGNOSIS — F32.1 DEPRESSION, MAJOR, SINGLE EPISODE, MODERATE (MULTI): ICD-10-CM

## 2024-07-31 DIAGNOSIS — D50.9 IRON DEFICIENCY ANEMIA, UNSPECIFIED IRON DEFICIENCY ANEMIA TYPE: ICD-10-CM

## 2024-07-31 LAB
25(OH)D3 SERPL-MCNC: 40 NG/ML (ref 30–100)
ALBUMIN SERPL BCP-MCNC: 3.8 G/DL (ref 3.4–5)
ALP SERPL-CCNC: 107 U/L (ref 33–110)
ALT SERPL W P-5'-P-CCNC: 24 U/L (ref 7–45)
ANION GAP SERPL CALC-SCNC: 9 MMOL/L (ref 10–20)
AST SERPL W P-5'-P-CCNC: 21 U/L (ref 9–39)
BILIRUB SERPL-MCNC: 0.5 MG/DL (ref 0–1.2)
BUN SERPL-MCNC: 10 MG/DL (ref 6–23)
CALCIUM SERPL-MCNC: 9.1 MG/DL (ref 8.6–10.3)
CHLORIDE SERPL-SCNC: 102 MMOL/L (ref 98–107)
CHOLEST SERPL-MCNC: 131 MG/DL (ref 0–199)
CHOLESTEROL/HDL RATIO: 2.4
CO2 SERPL-SCNC: 30 MMOL/L (ref 21–32)
CREAT SERPL-MCNC: 0.67 MG/DL (ref 0.5–1.05)
EGFRCR SERPLBLD CKD-EPI 2021: >90 ML/MIN/1.73M*2
ERYTHROCYTE [DISTWIDTH] IN BLOOD BY AUTOMATED COUNT: 13.4 % (ref 11.5–14.5)
EST. AVERAGE GLUCOSE BLD GHB EST-MCNC: 163 MG/DL
FERRITIN SERPL-MCNC: 24 NG/ML (ref 8–150)
GLUCOSE SERPL-MCNC: 120 MG/DL (ref 74–99)
HBA1C MFR BLD: 7.3 %
HCT VFR BLD AUTO: 34.9 % (ref 36–46)
HDLC SERPL-MCNC: 54.4 MG/DL
HGB BLD-MCNC: 11.4 G/DL (ref 12–16)
IRON SERPL-MCNC: 88 UG/DL (ref 35–150)
LDLC SERPL CALC-MCNC: 60 MG/DL
MCH RBC QN AUTO: 28.1 PG (ref 26–34)
MCHC RBC AUTO-ENTMCNC: 32.7 G/DL (ref 32–36)
MCV RBC AUTO: 86 FL (ref 80–100)
NON HDL CHOLESTEROL: 77 MG/DL (ref 0–149)
NRBC BLD-RTO: 0 /100 WBCS (ref 0–0)
PLATELET # BLD AUTO: 258 X10*3/UL (ref 150–450)
POTASSIUM SERPL-SCNC: 3.6 MMOL/L (ref 3.5–5.3)
PROT SERPL-MCNC: 6.7 G/DL (ref 6.4–8.2)
RBC # BLD AUTO: 4.05 X10*6/UL (ref 4–5.2)
SODIUM SERPL-SCNC: 137 MMOL/L (ref 136–145)
TRIGL SERPL-MCNC: 82 MG/DL (ref 0–149)
TSH SERPL-ACNC: 2.16 MIU/L (ref 0.44–3.98)
VIT B12 SERPL-MCNC: 451 PG/ML (ref 211–911)
VLDL: 16 MG/DL (ref 0–40)
WBC # BLD AUTO: 4.4 X10*3/UL (ref 4.4–11.3)

## 2024-07-31 PROCEDURE — 83036 HEMOGLOBIN GLYCOSYLATED A1C: CPT

## 2024-07-31 PROCEDURE — 82306 VITAMIN D 25 HYDROXY: CPT

## 2024-07-31 PROCEDURE — 84443 ASSAY THYROID STIM HORMONE: CPT

## 2024-07-31 PROCEDURE — 80061 LIPID PANEL: CPT

## 2024-07-31 PROCEDURE — 80053 COMPREHEN METABOLIC PANEL: CPT

## 2024-07-31 PROCEDURE — 82728 ASSAY OF FERRITIN: CPT

## 2024-07-31 PROCEDURE — 82607 VITAMIN B-12: CPT

## 2024-07-31 PROCEDURE — 36415 COLL VENOUS BLD VENIPUNCTURE: CPT

## 2024-07-31 PROCEDURE — 85027 COMPLETE CBC AUTOMATED: CPT

## 2024-07-31 PROCEDURE — 83540 ASSAY OF IRON: CPT

## 2024-08-05 ENCOUNTER — APPOINTMENT (OUTPATIENT)
Dept: PRIMARY CARE | Facility: CLINIC | Age: 59
End: 2024-08-05
Payer: COMMERCIAL

## 2024-08-05 VITALS
WEIGHT: 184 LBS | OXYGEN SATURATION: 97 % | DIASTOLIC BLOOD PRESSURE: 85 MMHG | BODY MASS INDEX: 28.88 KG/M2 | SYSTOLIC BLOOD PRESSURE: 137 MMHG | HEART RATE: 85 BPM | HEIGHT: 67 IN | RESPIRATION RATE: 14 BRPM | TEMPERATURE: 97.3 F

## 2024-08-05 DIAGNOSIS — F41.1 GENERALIZED ANXIETY DISORDER: ICD-10-CM

## 2024-08-05 DIAGNOSIS — E10.65 TYPE 1 DIABETES MELLITUS WITH HYPERGLYCEMIA (MULTI): Primary | ICD-10-CM

## 2024-08-05 DIAGNOSIS — Z79.899 CONTROLLED SUBSTANCE AGREEMENT SIGNED: ICD-10-CM

## 2024-08-05 DIAGNOSIS — I10 BENIGN ESSENTIAL HYPERTENSION: ICD-10-CM

## 2024-08-05 DIAGNOSIS — E78.2 MIXED HYPERLIPIDEMIA: ICD-10-CM

## 2024-08-05 DIAGNOSIS — J30.9 ALLERGIC RHINITIS, UNSPECIFIED SEASONALITY, UNSPECIFIED TRIGGER: ICD-10-CM

## 2024-08-05 DIAGNOSIS — F32.1 DEPRESSION, MAJOR, SINGLE EPISODE, MODERATE (MULTI): ICD-10-CM

## 2024-08-05 DIAGNOSIS — D50.9 IRON DEFICIENCY ANEMIA, UNSPECIFIED IRON DEFICIENCY ANEMIA TYPE: ICD-10-CM

## 2024-08-05 DIAGNOSIS — E87.6 HYPOKALEMIA: ICD-10-CM

## 2024-08-05 LAB
POC AMPHETAMINE: NEGATIVE NG/ML
POC BARBITURATES: NEGATIVE NG/ML
POC BENZODIAZEPINES: NEGATIVE NG/ML
POC BUPRENORPHINE URINE: NEGATIVE NG/ML
POC COCAINE: NEGATIVE NG/ML
POC MDMA (NG/ML) IN URINE: NEGATIVE NG/ML
POC METHADONE MANUALLY ENTERED: NEGATIVE NG/ML
POC METHAMPHETAMINE: NEGATIVE NG/ML
POC MORPHINE URINE: NEGATIVE NG/ML
POC OPIATES: NORMAL NG/ML
POC OXYCODONE: NEGATIVE NG/ML
POC PHENCYCLIDINE (PCP): NEGATIVE NG/ML
POC THC: NEGATIVE NG/ML
POC TICYCLIC ANTIDEPRESSANTS (TCA): NORMAL NG/ML

## 2024-08-05 PROCEDURE — 3048F LDL-C <100 MG/DL: CPT | Performed by: FAMILY MEDICINE

## 2024-08-05 PROCEDURE — G2211 COMPLEX E/M VISIT ADD ON: HCPCS | Performed by: FAMILY MEDICINE

## 2024-08-05 PROCEDURE — 4010F ACE/ARB THERAPY RXD/TAKEN: CPT | Performed by: FAMILY MEDICINE

## 2024-08-05 PROCEDURE — 1036F TOBACCO NON-USER: CPT | Performed by: FAMILY MEDICINE

## 2024-08-05 PROCEDURE — 80305 DRUG TEST PRSMV DIR OPT OBS: CPT | Performed by: FAMILY MEDICINE

## 2024-08-05 PROCEDURE — 3008F BODY MASS INDEX DOCD: CPT | Performed by: FAMILY MEDICINE

## 2024-08-05 PROCEDURE — 99214 OFFICE O/P EST MOD 30 MIN: CPT | Performed by: FAMILY MEDICINE

## 2024-08-05 PROCEDURE — 3051F HG A1C>EQUAL 7.0%<8.0%: CPT | Performed by: FAMILY MEDICINE

## 2024-08-05 PROCEDURE — 3079F DIAST BP 80-89 MM HG: CPT | Performed by: FAMILY MEDICINE

## 2024-08-05 PROCEDURE — 3075F SYST BP GE 130 - 139MM HG: CPT | Performed by: FAMILY MEDICINE

## 2024-08-05 RX ORDER — ROSUVASTATIN CALCIUM 10 MG/1
10 TABLET, COATED ORAL DAILY
Qty: 90 TABLET | Refills: 1 | Status: SHIPPED | OUTPATIENT
Start: 2024-08-05 | End: 2025-08-05

## 2024-08-05 RX ORDER — TELMISARTAN 80 MG/1
80 TABLET ORAL DAILY
Qty: 90 TABLET | Refills: 1 | Status: SHIPPED | OUTPATIENT
Start: 2024-08-05

## 2024-08-05 RX ORDER — POTASSIUM CHLORIDE 20 MEQ/1
20 TABLET, EXTENDED RELEASE ORAL DAILY
Qty: 90 TABLET | Refills: 1 | Status: SHIPPED | OUTPATIENT
Start: 2024-08-05

## 2024-08-05 RX ORDER — MONTELUKAST SODIUM 10 MG/1
10 TABLET ORAL NIGHTLY
Qty: 90 TABLET | Refills: 1 | Status: SHIPPED | OUTPATIENT
Start: 2024-08-05

## 2024-08-05 RX ORDER — INSULIN PMP CART,AUT,G6/7,CNTR
EACH SUBCUTANEOUS
COMMUNITY
Start: 2024-04-26

## 2024-08-05 RX ORDER — MOMETASONE FUROATE MONOHYDRATE 50 UG/1
SPRAY, METERED NASAL EVERY 12 HOURS
COMMUNITY
Start: 2022-08-25

## 2024-08-05 RX ORDER — INSULIN PMP CART,AUT,G6/7,CNTR
EACH SUBCUTANEOUS
COMMUNITY
Start: 2024-08-01

## 2024-08-05 RX ORDER — CLONAZEPAM 0.5 MG/1
TABLET ORAL
Qty: 45 TABLET | Refills: 2 | Status: SHIPPED | OUTPATIENT
Start: 2024-08-05

## 2024-08-05 RX ORDER — BLOOD-GLUCOSE TRANSMITTER
EACH MISCELLANEOUS
COMMUNITY
Start: 2024-07-22

## 2024-08-05 ASSESSMENT — ENCOUNTER SYMPTOMS
ABDOMINAL PAIN: 0
CHEST TIGHTNESS: 0
ARTHRALGIAS: 0
CHILLS: 0
PALPITATIONS: 0
CONFUSION: 0
SHORTNESS OF BREATH: 0
FEVER: 0

## 2024-08-05 NOTE — ASSESSMENT & PLAN NOTE
A1c down to 7.3%.  Patient says she got a new insulin pump about 60 days ago and her noticed her numbers have been much better.  She will follow-up with endocrinology at the end of the month.

## 2024-08-05 NOTE — PROGRESS NOTES
"Subjective   Patient ID: Kinsey Cao is a 58 y.o. female who presents for Follow-up (4 month ).    HPI patient today for follow-up of ongoing healthcare issues and review blood work overall is been feeling good emotionally things are stable on current medication she would like to continue both the fluoxetine and clonazepam.  She says about 60 days ago she got a new insulin pump and has noticed that her sugars are doing much better and she is hopeful that her A1c has improved as well.    Review of Systems   Constitutional:  Negative for chills and fever.   HENT:  Negative for congestion and ear pain.    Eyes:  Negative for visual disturbance.   Respiratory:  Negative for chest tightness and shortness of breath.    Cardiovascular:  Negative for chest pain and palpitations.   Gastrointestinal:  Negative for abdominal pain.   Musculoskeletal:  Negative for arthralgias.   Skin:  Negative for pallor.   Psychiatric/Behavioral:  Negative for confusion.        Objective   /85   Pulse 85   Temp 36.3 °C (97.3 °F)   Resp 14   Ht 1.702 m (5' 7\")   Wt 83.5 kg (184 lb)   SpO2 97%   BMI 28.82 kg/m²     Physical Exam  Vitals and nursing note reviewed.   Constitutional:       General: She is not in acute distress.     Appearance: Normal appearance. She is not ill-appearing.   HENT:      Head: Normocephalic and atraumatic.      Right Ear: Tympanic membrane, ear canal and external ear normal.      Left Ear: Tympanic membrane, ear canal and external ear normal.      Mouth/Throat:      Pharynx: Oropharynx is clear.   Eyes:      Extraocular Movements: Extraocular movements intact.   Cardiovascular:      Rate and Rhythm: Normal rate and regular rhythm.      Pulses: Normal pulses.      Heart sounds: Normal heart sounds.   Pulmonary:      Effort: Pulmonary effort is normal.      Breath sounds: Normal breath sounds.   Abdominal:      General: Abdomen is flat. Bowel sounds are normal.      Palpations: Abdomen is soft.      " Tenderness: There is no abdominal tenderness.   Musculoskeletal:         General: Normal range of motion.      Cervical back: Neck supple.   Skin:     General: Skin is warm.   Neurological:      Mental Status: She is alert and oriented to person, place, and time. Mental status is at baseline.   Psychiatric:         Mood and Affect: Mood normal.       Recent Results (from the past 1008 hour(s))   CBC    Collection Time: 07/31/24  8:14 AM   Result Value Ref Range    WBC 4.4 4.4 - 11.3 x10*3/uL    nRBC 0.0 0.0 - 0.0 /100 WBCs    RBC 4.05 4.00 - 5.20 x10*6/uL    Hemoglobin 11.4 (L) 12.0 - 16.0 g/dL    Hematocrit 34.9 (L) 36.0 - 46.0 %    MCV 86 80 - 100 fL    MCH 28.1 26.0 - 34.0 pg    MCHC 32.7 32.0 - 36.0 g/dL    RDW 13.4 11.5 - 14.5 %    Platelets 258 150 - 450 x10*3/uL   Comprehensive Metabolic Panel    Collection Time: 07/31/24  8:14 AM   Result Value Ref Range    Glucose 120 (H) 74 - 99 mg/dL    Sodium 137 136 - 145 mmol/L    Potassium 3.6 3.5 - 5.3 mmol/L    Chloride 102 98 - 107 mmol/L    Bicarbonate 30 21 - 32 mmol/L    Anion Gap 9 (L) 10 - 20 mmol/L    Urea Nitrogen 10 6 - 23 mg/dL    Creatinine 0.67 0.50 - 1.05 mg/dL    eGFR >90 >60 mL/min/1.73m*2    Calcium 9.1 8.6 - 10.3 mg/dL    Albumin 3.8 3.4 - 5.0 g/dL    Alkaline Phosphatase 107 33 - 110 U/L    Total Protein 6.7 6.4 - 8.2 g/dL    AST 21 9 - 39 U/L    Bilirubin, Total 0.5 0.0 - 1.2 mg/dL    ALT 24 7 - 45 U/L   Hemoglobin A1C    Collection Time: 07/31/24  8:14 AM   Result Value Ref Range    Hemoglobin A1C 7.3 (H) see below %    Estimated Average Glucose 163 Not Established mg/dL   Ferritin    Collection Time: 07/31/24  8:14 AM   Result Value Ref Range    Ferritin 24 8 - 150 ng/mL   Iron    Collection Time: 07/31/24  8:14 AM   Result Value Ref Range    Iron 88 35 - 150 ug/dL   Lipid Panel    Collection Time: 07/31/24  8:14 AM   Result Value Ref Range    Cholesterol 131 0 - 199 mg/dL    HDL-Cholesterol 54.4 mg/dL    Cholesterol/HDL Ratio 2.4     LDL  Calculated 60 <=99 mg/dL    VLDL 16 0 - 40 mg/dL    Triglycerides 82 0 - 149 mg/dL    Non HDL Cholesterol 77 0 - 149 mg/dL   Vitamin D 25-Hydroxy,Total (for eval of Vitamin D levels)    Collection Time: 07/31/24  8:14 AM   Result Value Ref Range    Vitamin D, 25-Hydroxy, Total 40 30 - 100 ng/mL   Vitamin B12    Collection Time: 07/31/24  8:14 AM   Result Value Ref Range    Vitamin B12 451 211 - 911 pg/mL   TSH with reflex to Free T4 if abnormal    Collection Time: 07/31/24  8:14 AM   Result Value Ref Range    Thyroid Stimulating Hormone 2.16 0.44 - 3.98 mIU/L     Recent labs reviewed with patient overall numbers are stable nice improvement in A1c down to 7.3%    Continue current medications necessary refills provided    Follow through with endocrinology    Controlled substance agreement signed and urine drug screen completed    She will follow through with her gynecologist for routine checkup this fall they manage her mammogram up-to-date  She is return to office in 4 months with repeat fasting labs      Assessment/Plan   Problem List Items Addressed This Visit             ICD-10-CM    Allergic rhinitis J30.9     Stable continue current treatment         Relevant Medications    montelukast (Singulair) 10 mg tablet    Benign essential hypertension I10     Stable continue current medication         Relevant Medications    telmisartan (MIcarDIS) 80 mg tablet    Other Relevant Orders    Comprehensive Metabolic Panel    Follow Up In Primary Care - Established    Depression, major, single episode, moderate (Multi) F32.1     Stable continue fluoxetine         Relevant Orders    Follow Up In Primary Care - Established    Generalized anxiety disorder F41.1     Stable continue clonazepam  Refill provide    I have personally reviewed the OARRS report.  I have considered the risks of abuse,dependence, addiction and diversion.  I believe it is clinically appropriate to prescribe this medication.            Relevant Medications     clonazePAM (KlonoPIN) 0.5 mg tablet    Hyperlipidemia E78.5     Stable continue Crestor 10 mg daily         Relevant Medications    rosuvastatin (Crestor) 10 mg tablet    Other Relevant Orders    Comprehensive Metabolic Panel    Lipid Panel    Follow Up In Primary Care - Established    Hypokalemia E87.6     Stable continue to monitor supplement as needed         Relevant Medications    potassium chloride CR 20 mEq ER tablet    Iron deficiency anemia D50.9     Stable continue to monitor and supplement         Relevant Orders    CBC    Ferritin    Iron    Follow Up In Primary Care - Established    Type 1 diabetes mellitus (Multi) - Primary E10.9     A1c down to 7.3%.  Patient says she got a new insulin pump about 60 days ago and her noticed her numbers have been much better.  She will follow-up with endocrinology at the end of the month.         Relevant Orders    Albumin-Creatinine Ratio, Urine Random    CBC    Comprehensive Metabolic Panel    Hemoglobin A1C    Follow Up In Primary Care - Established    Controlled substance agreement signed Z79.899     Urine drug screen         Relevant Orders    POCT waived urine drug screen manually resulted

## 2024-08-05 NOTE — ASSESSMENT & PLAN NOTE
Stable continue clonazepam  Refill provide    I have personally reviewed the OARRS report.  I have considered the risks of abuse,dependence, addiction and diversion.  I believe it is clinically appropriate to prescribe this medication.

## 2024-08-14 ENCOUNTER — TELEPHONE (OUTPATIENT)
Dept: PRIMARY CARE | Facility: CLINIC | Age: 59
End: 2024-08-14
Payer: COMMERCIAL

## 2024-08-14 NOTE — TELEPHONE ENCOUNTER
Patient went to the  urgent care in [place] last night with symptoms of a sinus infection. Patient does have a sinus infection and tested positive for covid. She was prescribed amoxicillin and paxlovid. Patient just wanted to make you aware, and will call if symptoms do not get better. No action needed at this time.

## 2024-09-26 DIAGNOSIS — N95.2 POSTMENOPAUSE ATROPHIC VAGINITIS: ICD-10-CM

## 2024-09-26 RX ORDER — ESTRADIOL 10 UG/1
TABLET VAGINAL
Qty: 32 TABLET | Refills: 3 | Status: SHIPPED | OUTPATIENT
Start: 2024-09-26

## 2024-10-16 ENCOUNTER — OFFICE VISIT (OUTPATIENT)
Dept: PRIMARY CARE | Facility: CLINIC | Age: 59
End: 2024-10-16
Payer: COMMERCIAL

## 2024-10-16 VITALS
OXYGEN SATURATION: 97 % | SYSTOLIC BLOOD PRESSURE: 125 MMHG | TEMPERATURE: 97.8 F | BODY MASS INDEX: 28.51 KG/M2 | WEIGHT: 182 LBS | RESPIRATION RATE: 14 BRPM | DIASTOLIC BLOOD PRESSURE: 76 MMHG | HEART RATE: 68 BPM

## 2024-10-16 DIAGNOSIS — B00.1 RECURRENT COLD SORES: Primary | ICD-10-CM

## 2024-10-16 DIAGNOSIS — I10 BENIGN ESSENTIAL HYPERTENSION: ICD-10-CM

## 2024-10-16 DIAGNOSIS — E87.6 HYPOKALEMIA: ICD-10-CM

## 2024-10-16 DIAGNOSIS — J30.9 ALLERGIC RHINITIS, UNSPECIFIED SEASONALITY, UNSPECIFIED TRIGGER: ICD-10-CM

## 2024-10-16 DIAGNOSIS — E78.2 MIXED HYPERLIPIDEMIA: ICD-10-CM

## 2024-10-16 DIAGNOSIS — F32.1 DEPRESSION, MAJOR, SINGLE EPISODE, MODERATE (MULTI): ICD-10-CM

## 2024-10-16 PROCEDURE — 1036F TOBACCO NON-USER: CPT | Performed by: FAMILY MEDICINE

## 2024-10-16 PROCEDURE — 3074F SYST BP LT 130 MM HG: CPT | Performed by: FAMILY MEDICINE

## 2024-10-16 PROCEDURE — 4010F ACE/ARB THERAPY RXD/TAKEN: CPT | Performed by: FAMILY MEDICINE

## 2024-10-16 PROCEDURE — 3078F DIAST BP <80 MM HG: CPT | Performed by: FAMILY MEDICINE

## 2024-10-16 PROCEDURE — 3048F LDL-C <100 MG/DL: CPT | Performed by: FAMILY MEDICINE

## 2024-10-16 PROCEDURE — 3051F HG A1C>EQUAL 7.0%<8.0%: CPT | Performed by: FAMILY MEDICINE

## 2024-10-16 PROCEDURE — 99213 OFFICE O/P EST LOW 20 MIN: CPT | Performed by: FAMILY MEDICINE

## 2024-10-16 RX ORDER — AMLODIPINE BESYLATE 5 MG/1
5 TABLET ORAL DAILY
Qty: 90 TABLET | Refills: 1 | Status: SHIPPED | OUTPATIENT
Start: 2024-10-16

## 2024-10-16 RX ORDER — MONTELUKAST SODIUM 10 MG/1
10 TABLET ORAL NIGHTLY
Qty: 90 TABLET | Refills: 1 | Status: SHIPPED | OUTPATIENT
Start: 2024-10-16

## 2024-10-16 RX ORDER — HYDROCHLOROTHIAZIDE 25 MG/1
25 TABLET ORAL DAILY
Qty: 90 TABLET | Refills: 1 | Status: SHIPPED | OUTPATIENT
Start: 2024-10-16

## 2024-10-16 RX ORDER — ROSUVASTATIN CALCIUM 10 MG/1
10 TABLET, COATED ORAL DAILY
Qty: 90 TABLET | Refills: 1 | Status: SHIPPED | OUTPATIENT
Start: 2024-10-16 | End: 2025-10-16

## 2024-10-16 RX ORDER — POTASSIUM CHLORIDE 20 MEQ/1
20 TABLET, EXTENDED RELEASE ORAL DAILY
Qty: 90 TABLET | Refills: 1 | Status: SHIPPED | OUTPATIENT
Start: 2024-10-16

## 2024-10-16 RX ORDER — TELMISARTAN 80 MG/1
80 TABLET ORAL DAILY
Qty: 90 TABLET | Refills: 1 | Status: SHIPPED | OUTPATIENT
Start: 2024-10-16

## 2024-10-16 RX ORDER — CARVEDILOL 12.5 MG/1
12.5 TABLET ORAL 2 TIMES DAILY
Qty: 180 TABLET | Refills: 1 | Status: SHIPPED | OUTPATIENT
Start: 2024-10-16 | End: 2025-10-16

## 2024-10-16 RX ORDER — FLUOXETINE HYDROCHLORIDE 40 MG/1
40 CAPSULE ORAL DAILY
Qty: 90 CAPSULE | Refills: 1 | Status: SHIPPED | OUTPATIENT
Start: 2024-10-16 | End: 2025-10-16

## 2024-10-16 RX ORDER — VALACYCLOVIR HYDROCHLORIDE 1 G/1
1000 TABLET, FILM COATED ORAL 3 TIMES DAILY
Qty: 21 TABLET | Refills: 0 | Status: SHIPPED | OUTPATIENT
Start: 2024-10-16 | End: 2024-10-23

## 2024-10-16 ASSESSMENT — ENCOUNTER SYMPTOMS
CONFUSION: 0
PALPITATIONS: 0
CHILLS: 0
ARTHRALGIAS: 0
ABDOMINAL PAIN: 0
CHEST TIGHTNESS: 0
FEVER: 0
SHORTNESS OF BREATH: 0

## 2024-10-16 NOTE — PROGRESS NOTES
Subjective   Patient ID: Kinsey Cao is a 59 y.o. female who presents for Mouth Lesions, Sinusitis, and Earache.    Mouth Lesions   Associated symptoms include ear pain and mouth sores. Pertinent negatives include no fever, no abdominal pain and no congestion.   Sinusitis  Associated symptoms include ear pain. Pertinent negatives include no chills, congestion or shortness of breath.   Earache   Pertinent negatives include no abdominal pain.    patient in today complaining of painful blisters on her upper lip started a couple days ago.  She has some mild upper respiratory symptoms little bit of swollen glands nasal congestion no fevers no chills.    Review of Systems   Constitutional:  Negative for chills and fever.   HENT:  Positive for ear pain and mouth sores. Negative for congestion.    Eyes:  Negative for visual disturbance.   Respiratory:  Negative for chest tightness and shortness of breath.    Cardiovascular:  Negative for chest pain and palpitations.   Gastrointestinal:  Negative for abdominal pain.   Musculoskeletal:  Negative for arthralgias.   Skin:  Negative for pallor.   Psychiatric/Behavioral:  Negative for confusion.        Objective   /76   Pulse 68   Temp 36.6 °C (97.8 °F)   Resp 14   Wt 82.6 kg (182 lb)   SpO2 97%   BMI 28.51 kg/m²     Physical Exam  Constitutional:       General: She is not in acute distress.     Appearance: Normal appearance. She is not ill-appearing.   HENT:      Head: Normocephalic.      Right Ear: Tympanic membrane, ear canal and external ear normal.      Left Ear: Tympanic membrane, ear canal and external ear normal.      Nose: Nose normal.      Mouth/Throat:      Mouth: Mucous membranes are moist.      Pharynx: Oropharynx is clear. No oropharyngeal exudate or posterior oropharyngeal erythema.      Comments: Several blisters across her upper lip.  Tongue soft and hard palate and inner cheeks without any acute lesions.  Cardiovascular:      Rate and Rhythm:  Normal rate and regular rhythm.      Pulses: Normal pulses.   Pulmonary:      Effort: Pulmonary effort is normal. No respiratory distress.      Breath sounds: Normal breath sounds.     Keep follow-up appointment in December with fasting labs    Assessment/Plan   Problem List Items Addressed This Visit             ICD-10-CM    Allergic rhinitis J30.9    Relevant Medications    montelukast (Singulair) 10 mg tablet    Benign essential hypertension I10    Relevant Medications    amLODIPine (Norvasc) 5 mg tablet    carvedilol (Coreg) 12.5 mg tablet    hydroCHLOROthiazide (HYDRODiuril) 25 mg tablet    telmisartan (MIcarDIS) 80 mg tablet    Depression, major, single episode, moderate (Multi) F32.1    Relevant Medications    FLUoxetine (PROzac) 40 mg capsule    Hyperlipidemia E78.5    Relevant Medications    rosuvastatin (Crestor) 10 mg tablet    Hypokalemia E87.6    Relevant Medications    potassium chloride CR 20 mEq ER tablet    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

## 2024-10-16 NOTE — ASSESSMENT & PLAN NOTE
Valtrex 1 g 3 times daily x 7 days    Topical Aquaphor  Topical Orajel for comfort    Call if anything worsens return in 2 to 3 weeks if no better

## 2024-10-16 NOTE — LETTER
October 16, 2024     Patient: Kinsey Cao   YOB: 1965   Date of Visit: 10/16/2024       To Whom It May Concern:    Kinsey Cao was seen in my clinic on 10/16/2024 at 5:00 pm. Please excuse Kinsey for her absence from work on this day to make the appointment.    If you have any questions or concerns, please don't hesitate to call.         Sincerely,         Laci Cooper MD        CC: No Recipients

## 2024-11-11 ENCOUNTER — APPOINTMENT (OUTPATIENT)
Dept: PRIMARY CARE | Facility: CLINIC | Age: 59
End: 2024-11-11
Payer: COMMERCIAL

## 2024-11-11 VITALS
SYSTOLIC BLOOD PRESSURE: 134 MMHG | BODY MASS INDEX: 28.51 KG/M2 | WEIGHT: 182 LBS | HEART RATE: 70 BPM | OXYGEN SATURATION: 97 % | DIASTOLIC BLOOD PRESSURE: 80 MMHG | RESPIRATION RATE: 15 BRPM | TEMPERATURE: 97.2 F

## 2024-11-11 DIAGNOSIS — H69.91 EUSTACHIAN TUBE DYSFUNCTION, RIGHT: Primary | ICD-10-CM

## 2024-11-11 DIAGNOSIS — I10 BENIGN ESSENTIAL HYPERTENSION: ICD-10-CM

## 2024-11-11 PROCEDURE — 99213 OFFICE O/P EST LOW 20 MIN: CPT | Performed by: FAMILY MEDICINE

## 2024-11-11 PROCEDURE — 1036F TOBACCO NON-USER: CPT | Performed by: FAMILY MEDICINE

## 2024-11-11 PROCEDURE — 3048F LDL-C <100 MG/DL: CPT | Performed by: FAMILY MEDICINE

## 2024-11-11 PROCEDURE — 3079F DIAST BP 80-89 MM HG: CPT | Performed by: FAMILY MEDICINE

## 2024-11-11 PROCEDURE — 3075F SYST BP GE 130 - 139MM HG: CPT | Performed by: FAMILY MEDICINE

## 2024-11-11 PROCEDURE — 4010F ACE/ARB THERAPY RXD/TAKEN: CPT | Performed by: FAMILY MEDICINE

## 2024-11-11 PROCEDURE — 3051F HG A1C>EQUAL 7.0%<8.0%: CPT | Performed by: FAMILY MEDICINE

## 2024-11-11 RX ORDER — AZITHROMYCIN 500 MG/1
500 TABLET, FILM COATED ORAL DAILY
Qty: 10 TABLET | Refills: 0 | Status: SHIPPED | OUTPATIENT
Start: 2024-11-11 | End: 2024-11-21

## 2024-11-11 RX ORDER — INSULIN PMP CART,AUT,G6/7,CNTR
EACH SUBCUTANEOUS
COMMUNITY
Start: 2024-11-08

## 2024-11-11 RX ORDER — FLUTICASONE PROPIONATE 50 MCG
1 SPRAY, SUSPENSION (ML) NASAL DAILY
Qty: 16 G | Refills: 1 | Status: SHIPPED | OUTPATIENT
Start: 2024-11-11 | End: 2025-01-10

## 2024-11-11 ASSESSMENT — ENCOUNTER SYMPTOMS
CARDIOVASCULAR NEGATIVE: 1
CONSTITUTIONAL NEGATIVE: 1
EYE ITCHING: 0
SINUS PRESSURE: 1
RESPIRATORY NEGATIVE: 1
SORE THROAT: 0

## 2024-11-11 NOTE — ASSESSMENT & PLAN NOTE
Zithromax 500 mg 1 a day x 10 days  Fluticasone nasal spray as directed    Call if anything worsens return to 2 weeks if no better

## 2024-11-11 NOTE — PROGRESS NOTES
Subjective   Patient ID: Kinsey Cao is a 59 y.o. female who presents for Sinusitis and Earache (4 days ).    Sinusitis  Associated symptoms include congestion, ear pain and sinus pressure. Pertinent negatives include no sore throat.   Earache   Pertinent negatives include no sore throat.    patient complaining of a right earache with some nasal congestion and drainage since last Wednesday.  She had some swollen glands as well.  No fevers no chills.  Not really having much in the way of a cough.    Review of Systems   Constitutional: Negative.    HENT:  Positive for congestion, ear pain, postnasal drip and sinus pressure. Negative for nosebleeds and sore throat.    Eyes:  Negative for itching.   Respiratory: Negative.     Cardiovascular: Negative.        Objective   /80   Pulse 70   Temp 36.2 °C (97.2 °F)   Resp 15   Wt 82.6 kg (182 lb)   SpO2 97%   BMI 28.51 kg/m²     Physical Exam  Vitals and nursing note reviewed.   Constitutional:       General: She is not in acute distress.     Appearance: Normal appearance. She is not ill-appearing.   HENT:      Head: Normocephalic and atraumatic.      Right Ear: Tympanic membrane, ear canal and external ear normal.      Left Ear: Tympanic membrane, ear canal and external ear normal.      Nose: Congestion present.      Mouth/Throat:      Pharynx: Oropharynx is clear. No oropharyngeal exudate or posterior oropharyngeal erythema.   Eyes:      Extraocular Movements: Extraocular movements intact.   Cardiovascular:      Rate and Rhythm: Normal rate and regular rhythm.      Pulses: Normal pulses.      Heart sounds: Normal heart sounds.   Pulmonary:      Effort: Pulmonary effort is normal.      Breath sounds: Normal breath sounds.   Musculoskeletal:      Cervical back: Neck supple.   Skin:     General: Skin is warm.   Neurological:      Mental Status: She is alert and oriented to person, place, and time. Mental status is at baseline.         Assessment/Plan    Problem List Items Addressed This Visit             ICD-10-CM    Benign essential hypertension I10     BP stable continue treatment         Eustachian tube dysfunction, right - Primary H69.91     Zithromax 500 mg 1 a day x 10 days  Fluticasone nasal spray as directed    Call if anything worsens return to 2 weeks if no better         Relevant Medications    azithromycin (Zithromax) 500 mg tablet    fluticasone (Flonase) 50 mcg/actuation nasal spray

## 2024-11-18 NOTE — PROGRESS NOTES
"Kinsey Cao is a 59 y.o. female who is here for a routine exam. PCP = Laci Cooper MD  Family H/o breast cancer in 3 maternal aunts   No family H/o colon cancer   She had negative pap and HPV in 2020   She has a H/o uncontrolled diabetes, on insulin pump   APE Concerns: none    Other Concerns: none  Preventative:  Last mammogram: 11/2022   Last Colonoscopy: 2023, normal  LPAP 2020, normal   Seat Belt Use: always    GynHx:      Social History     Substance and Sexual Activity   Sexual Activity Yes    Partners: Male    Birth control/protection: Post-menopausal         SoHx:  Substance:   Tobacco Use: Low Risk  (11/21/2024)    Patient History     Smoking Tobacco Use: Never     Smokeless Tobacco Use: Never     Passive Exposure: Past      Social History     Substance and Sexual Activity   Drug Use Never      Social History     Substance and Sexual Activity   Alcohol Use Not Currently       Past Medical History:   Diagnosis Date    Delayed emergence from general anesthesia     Diabetes mellitus (Multi)     GERD (gastroesophageal reflux disease)     Hypertension     Personal history of other diseases of the circulatory system 10/25/2019    History of hypertension    Personal history of other diseases of the digestive system 10/25/2019    History of hiatal hernia    Personal history of other endocrine, nutritional and metabolic disease 10/25/2019    History of diabetes mellitus    Personal history of other medical treatment 10/25/2019    History of screening mammography      Past Surgical History:   Procedure Laterality Date    DENTAL IMPLANT      permanent bridge    OTHER SURGICAL HISTORY  10/25/2019    Breast surgery    OTHER SURGICAL HISTORY  10/25/2019    Shoulder surgery    OTHER SURGICAL HISTORY  10/25/2019    Hemorrhoidectomy      Objective   /70   Ht 1.702 m (5' 7\")   Wt 82.1 kg (181 lb)   BMI 28.35 kg/m²      General:   Alert and oriented, in no acute distress   Neck: Supple. No visible " thyromegaly.    Breast/Axilla: Normal to palpation bilaterally without masses, skin changes, or nipple discharge.    Abdomen: Soft, non-tender, without masses or organomegaly   Vulva: Normal architecture without erythema, masses, or lesions.    Vagina: Normal mucosa without lesions, masses, or atrophy. No abnormal vaginal discharge.    Cervix: Normal without masses, lesions, or signs of cervicitis.    Uterus: Normal mobile, non-enlarged uterus    Adnexa: Normal without masses or lesions   Pelvic Floor No POP noted. No high tone pelvic floor    Psych Normal affect. Normal mood.      Problem List Items Addressed This Visit       Encounter for gynecological examination with abnormal finding - Primary    Postmenopausal atrophic vaginitis     Other Visit Diagnoses       Screening mammogram for breast cancer        Relevant Orders    BI mammo bilateral screening tomosynthesis             Assessment/Plan    Thank you for coming to your annual exam. Your findings during the exam were normal. Specific topics addressed during this exam included: healthy lifestyle, well woman screening guidelines,  Healthy diet with high fiber, Weight bearing exercise 3 to 5 times a week, Multivitamins and calcium     Actions performed during this visit include:  - Clinical breast exam  - Clinical pelvic exam  - mammogram ordered   Severe VVA, erx for yuvafem done  PAP and HPV ordered    Please return for your next visit in 1 year.

## 2024-11-20 ENCOUNTER — APPOINTMENT (OUTPATIENT)
Dept: ORTHOPEDIC SURGERY | Facility: CLINIC | Age: 59
End: 2024-11-20
Payer: COMMERCIAL

## 2024-11-20 VITALS — BODY MASS INDEX: 28.56 KG/M2 | WEIGHT: 182 LBS | HEIGHT: 67 IN

## 2024-11-20 DIAGNOSIS — M65.331 TRIGGER MIDDLE FINGER OF RIGHT HAND: Primary | ICD-10-CM

## 2024-11-20 PROCEDURE — 4010F ACE/ARB THERAPY RXD/TAKEN: CPT | Performed by: ORTHOPAEDIC SURGERY

## 2024-11-20 PROCEDURE — 3008F BODY MASS INDEX DOCD: CPT | Performed by: ORTHOPAEDIC SURGERY

## 2024-11-20 PROCEDURE — 99214 OFFICE O/P EST MOD 30 MIN: CPT | Performed by: ORTHOPAEDIC SURGERY

## 2024-11-20 PROCEDURE — 3048F LDL-C <100 MG/DL: CPT | Performed by: ORTHOPAEDIC SURGERY

## 2024-11-20 PROCEDURE — 1036F TOBACCO NON-USER: CPT | Performed by: ORTHOPAEDIC SURGERY

## 2024-11-20 PROCEDURE — 3051F HG A1C>EQUAL 7.0%<8.0%: CPT | Performed by: ORTHOPAEDIC SURGERY

## 2024-11-20 ASSESSMENT — PAIN - FUNCTIONAL ASSESSMENT: PAIN_FUNCTIONAL_ASSESSMENT: 0-10

## 2024-11-20 ASSESSMENT — PAIN SCALES - GENERAL: PAINLEVEL_OUTOF10: 0 - NO PAIN

## 2024-11-20 NOTE — PROGRESS NOTES
59 y.o. female presents today for follow-up right long finger catching clicking locking and pain. The patient reports symptoms for the past few months, getting worse. Pain is controlled. Patient reports no numbness and tingling.  Reports no previous surgeries or trauma to the area.  Reports pain worse with use, better at rest.   Pain dull ache, sharp at times.  Type II diabetic on insulin.  History of multiple triggers in the past.  We have done a shot in this 1 in the past but it been a couple years, would like to try another.    Review of Systems    Constitutional: see HPI, no fever, no chills, not feeling tired, no significant weight gain or weight loss.   Eyes: No vision changes  ENT: no nosebleeds.   Cardiovascular: no chest pain.   Respiratory: no shortness of breath and no cough.   Gastrointestinal: no abdominal pain, no nausea, no vomiting and no diarrhea.   Musculoskeletal: per HPI  Neurological: no headache, no gait disturbances  Psychiatric: no depression and no sleep disturbances.   Endocrine: no muscle weakness and no muscle cramps.   Hematologic/Lymphatic: no swollen glands and no tendency for easy bruising or excessive swelling.     Patient's past medical history, past surgical history, allergies, and medications have been reviewed unless otherwise noted in the chart.     Trigger Exam  Digit: Right long finger A1 inspection:  no evidence of infection, no erythema, no edema, no ecchymosis, Palpation:  compartments are soft, TTP A1 pulley Range of Motion:  full composite finger flexion, Stability:  no finger instability, Strength:  5/5 strength with flexion and extension, Skin:  intact, Vascular:  capillary refill <2 seconds distally, Sensation:  sensation intact to light touch distally, Other:  no pain with palpation or motion proximally in the wrist.      Constitutional   General appearance: Alert and in no acute distress. Well developed, well nourished.    Eyes   External Eye, Conjunctiva and lids:  Normal external exam - pupils were equal in size, round, reactive to light (PERRL) with normal accommodation and extraocular movements intact (EOMI).   Ears, Nose, Mouth, and Throat   Hearing: Normal.   Neck   Neck: No neck mass was observed. Supple.   Pulmonary   Respiratory effort: No respiratory distress.   Cardiovascular   Examination of extremities: No peripheral edema.   Psychiatric   Judgment and insight: Intact.   Orientation to person, place, and time: Alert and oriented x 3.       Mood and affect: Normal.      Right long finger trigger  Based on the history, physical exam and imaging studies above, the patient's presentation is consistent with the above diagnosis.  I had a long discussion with the patient regarding their presentation and the treatment options.  We discussed initial nonoperative versus operative management options as well as potential further diagnostic imaging.  We again discussed her treatment options going forward along with their associated risks and benefits. After thorough discussion, the patient has elected to proceed with conservative management. All questions were answered to the patients satisfaction who seems satisfied with the plan.  They will call the office with any questions/concerns.    Discussion I discussed the diagnosis and treatment options with the patient today along with their associated risks and benefits. After thorough discussion, the patient has elected to proceed with a corticosteroid injection with Kenalog and Xylocaine, which was performed in the office today under aseptic technique and the patient tolerated the procedure well.          Ortho Injections:           Injection site right long finger A1 pulley. Medication 1 cc 1% Xylocaine, 1 cc 10 mg Kenalog, Injection given under sterile conditions. No immediate complications noted. Post injection instructions given.   Follow-up 8 weeks no x-ray

## 2024-11-21 ENCOUNTER — APPOINTMENT (OUTPATIENT)
Dept: OBSTETRICS AND GYNECOLOGY | Facility: CLINIC | Age: 59
End: 2024-11-21
Payer: COMMERCIAL

## 2024-11-21 VITALS
WEIGHT: 181 LBS | BODY MASS INDEX: 28.41 KG/M2 | DIASTOLIC BLOOD PRESSURE: 70 MMHG | HEIGHT: 67 IN | SYSTOLIC BLOOD PRESSURE: 150 MMHG

## 2024-11-21 DIAGNOSIS — Z12.31 SCREENING MAMMOGRAM FOR BREAST CANCER: ICD-10-CM

## 2024-11-21 DIAGNOSIS — Z01.411 ENCOUNTER FOR GYNECOLOGICAL EXAMINATION WITH ABNORMAL FINDING: Primary | ICD-10-CM

## 2024-11-21 DIAGNOSIS — N95.2 POSTMENOPAUSAL ATROPHIC VAGINITIS: ICD-10-CM

## 2024-11-21 DIAGNOSIS — Z12.4 SCREENING FOR CERVICAL CANCER: ICD-10-CM

## 2024-11-21 DIAGNOSIS — Z11.51 SCREENING FOR HUMAN PAPILLOMAVIRUS (HPV): ICD-10-CM

## 2024-11-21 PROCEDURE — 3051F HG A1C>EQUAL 7.0%<8.0%: CPT | Performed by: OBSTETRICS & GYNECOLOGY

## 2024-11-21 PROCEDURE — 99396 PREV VISIT EST AGE 40-64: CPT | Performed by: OBSTETRICS & GYNECOLOGY

## 2024-11-21 PROCEDURE — 3077F SYST BP >= 140 MM HG: CPT | Performed by: OBSTETRICS & GYNECOLOGY

## 2024-11-21 PROCEDURE — 4010F ACE/ARB THERAPY RXD/TAKEN: CPT | Performed by: OBSTETRICS & GYNECOLOGY

## 2024-11-21 PROCEDURE — 1036F TOBACCO NON-USER: CPT | Performed by: OBSTETRICS & GYNECOLOGY

## 2024-11-21 PROCEDURE — 3078F DIAST BP <80 MM HG: CPT | Performed by: OBSTETRICS & GYNECOLOGY

## 2024-11-21 PROCEDURE — 3048F LDL-C <100 MG/DL: CPT | Performed by: OBSTETRICS & GYNECOLOGY

## 2024-11-21 PROCEDURE — 3008F BODY MASS INDEX DOCD: CPT | Performed by: OBSTETRICS & GYNECOLOGY

## 2024-12-01 ENCOUNTER — OFFICE VISIT (OUTPATIENT)
Dept: URGENT CARE | Age: 59
End: 2024-12-01
Payer: COMMERCIAL

## 2024-12-01 VITALS
OXYGEN SATURATION: 98 % | SYSTOLIC BLOOD PRESSURE: 172 MMHG | DIASTOLIC BLOOD PRESSURE: 81 MMHG | HEART RATE: 75 BPM | TEMPERATURE: 98.6 F

## 2024-12-01 DIAGNOSIS — J02.9 SORE THROAT: ICD-10-CM

## 2024-12-01 DIAGNOSIS — K12.1 ORAL ULCER: Primary | ICD-10-CM

## 2024-12-01 LAB — POC RAPID STREP: NEGATIVE

## 2024-12-01 RX ORDER — VALACYCLOVIR HYDROCHLORIDE 1 G/1
1000 TABLET, FILM COATED ORAL 2 TIMES DAILY
Qty: 14 TABLET | Refills: 0 | Status: SHIPPED | OUTPATIENT
Start: 2024-12-01 | End: 2024-12-08

## 2024-12-01 ASSESSMENT — ENCOUNTER SYMPTOMS
CHEST TIGHTNESS: 0
WHEEZING: 0
RHINORRHEA: 0
FATIGUE: 1
COUGH: 1
CHILLS: 0
SORE THROAT: 1
FEVER: 0
SHORTNESS OF BREATH: 0
HEADACHES: 0

## 2024-12-01 NOTE — LETTER
December 1, 2024     Patient: Kinsey Cao   YOB: 1965   Date of Visit: 12/1/2024       To Whom It May Concern:    Kinsey Cao was seen in my clinic on 12/1/2024 at 10:00 am. Please excuse Kinsey for her absence from work from 12/1-12/2/2024.    If you have any questions or concerns, please don't hesitate to call.         Sincerely,         Pat Manrique PA-C

## 2024-12-01 NOTE — PROGRESS NOTES
Subjective   Patient ID: Kinsey Cao is a 59 y.o. female. They present today with a chief complaint of No chief complaint on file..    History of Present Illness  Patient presents for right sided sore throat with pain radiating to her right ear. She states that she previously had oral ulcerations months ago. Then she ended up with a throat infection and was put on antibiotics a few weeks ago. Notes her pain returned on Wednesday. She denies fevers, chills, ear discharge. She thought she saw white spots in her mouth. She has had a slight runny nose and cough.           Past Medical History  Allergies as of 12/01/2024 - Reviewed 12/01/2024   Allergen Reaction Noted    Latex Anaphylaxis and Rash 02/01/2023    Cephalosporins Itching 02/01/2023    Codeine Unknown 12/11/2023    Ciprofloxacin Rash 02/01/2023    Doxycycline Rash 02/01/2023    Hydrocodone-acetaminophen Rash 02/01/2023    Metoclopramide hcl Rash 02/01/2023       (Not in a hospital admission)       Past Medical History:   Diagnosis Date    Delayed emergence from general anesthesia     Diabetes mellitus (Multi)     GERD (gastroesophageal reflux disease)     Hypertension     Personal history of other diseases of the circulatory system 10/25/2019    History of hypertension    Personal history of other diseases of the digestive system 10/25/2019    History of hiatal hernia    Personal history of other endocrine, nutritional and metabolic disease 10/25/2019    History of diabetes mellitus    Personal history of other medical treatment 10/25/2019    History of screening mammography       Past Surgical History:   Procedure Laterality Date    DENTAL IMPLANT      permanent bridge    OTHER SURGICAL HISTORY  10/25/2019    Breast surgery    OTHER SURGICAL HISTORY  10/25/2019    Shoulder surgery    OTHER SURGICAL HISTORY  10/25/2019    Hemorrhoidectomy        reports that she has never smoked. She has been exposed to tobacco smoke. She has never used smokeless  tobacco. She reports that she does not currently use alcohol. She reports that she does not use drugs.    Review of Systems  Review of Systems   Constitutional:  Positive for fatigue. Negative for chills and fever.   HENT:  Positive for congestion, ear pain and sore throat. Negative for rhinorrhea.    Respiratory:  Positive for cough. Negative for chest tightness, shortness of breath and wheezing.    Neurological:  Negative for headaches.                                  Objective    Vitals:    12/01/24 1005   BP: 172/81   Pulse: 75   Temp: 37 °C (98.6 °F)   SpO2: 98%     No LMP recorded. Patient is postmenopausal.    Physical Exam  Vitals and nursing note reviewed.   Constitutional:       General: She is not in acute distress.     Appearance: She is not ill-appearing.   HENT:      Head: Normocephalic.      Right Ear: Tympanic membrane and ear canal normal.      Left Ear: Tympanic membrane and ear canal normal.      Nose: Congestion present. No rhinorrhea.      Mouth/Throat:      Mouth: Mucous membranes are moist.      Pharynx: Posterior oropharyngeal erythema present.      Tonsils: No tonsillar exudate or tonsillar abscesses. 1+ on the right. 1+ on the left.      Comments: White ulcerations on erythematous base to right soft palate.   Cardiovascular:      Rate and Rhythm: Normal rate and regular rhythm.      Heart sounds: Normal heart sounds.   Pulmonary:      Effort: Pulmonary effort is normal. No respiratory distress.      Breath sounds: Normal breath sounds. No wheezing, rhonchi or rales.   Lymphadenopathy:      Cervical: Cervical adenopathy present.   Neurological:      Mental Status: She is alert.         Procedures    Point of Care Test & Imaging Results from this visit  Results for orders placed or performed in visit on 12/01/24   POCT rapid strep A manually resulted   Result Value Ref Range    POC Rapid Strep Negative Negative      No results found.    Diagnostic study results (if any) were reviewed by  Pat Manrique PA-C.    Assessment/Plan   Allergies, medications, history, and pertinent labs/EKGs/Imaging reviewed by Pat Manrique PA-C.     Medical Decision Making  MDM:  Signs and symptoms consistent with oral ulcerations, likely viral in nature. Rapid strep was negative. No signs or symptoms of bacterial infection or sepsis present. Supportive measures. Return to clinic if symptoms change or worsen. Follow-up with PCP.  Patient/Parent verbalized understanding and agrees with plan.      Orders and Diagnoses  Diagnoses and all orders for this visit:  Oral ulcer  -     valACYclovir (Valtrex) 1 gram tablet; Take 1 tablet (1,000 mg) by mouth 2 times a day for 7 days.  Sore throat  -     POCT rapid strep A manually resulted      Medical Admin Record      Patient disposition: Home    Electronically signed by Pat Manrique PA-C  10:51 AM

## 2024-12-02 ENCOUNTER — TELEPHONE (OUTPATIENT)
Dept: PRIMARY CARE | Facility: CLINIC | Age: 59
End: 2024-12-02
Payer: COMMERCIAL

## 2024-12-02 NOTE — TELEPHONE ENCOUNTER
Patient called in due to throat ulcers. She has Tried tylenol, gargling salt water no relief  she went to the med center yesterday and they put her on valtrex yesterday and she knows that it has not been long enough but she is in pain     I read the after visit summary, urgent care stated that there was no sign of bacterial infection and the rapid strep test was negative. They feel that it is viral.     please advise

## 2024-12-06 ENCOUNTER — TELEPHONE (OUTPATIENT)
Dept: OBSTETRICS AND GYNECOLOGY | Facility: CLINIC | Age: 59
End: 2024-12-06
Payer: COMMERCIAL

## 2024-12-06 NOTE — TELEPHONE ENCOUNTER
Patient called asking for a call back once her results are back and viewed from her Annual. Thank you

## 2024-12-08 DIAGNOSIS — H69.91 EUSTACHIAN TUBE DYSFUNCTION, RIGHT: ICD-10-CM

## 2024-12-09 ENCOUNTER — LAB (OUTPATIENT)
Dept: LAB | Facility: LAB | Age: 59
End: 2024-12-09
Payer: COMMERCIAL

## 2024-12-09 DIAGNOSIS — E78.2 MIXED HYPERLIPIDEMIA: ICD-10-CM

## 2024-12-09 DIAGNOSIS — E10.65 TYPE 1 DIABETES MELLITUS WITH HYPERGLYCEMIA (MULTI): ICD-10-CM

## 2024-12-09 DIAGNOSIS — I10 BENIGN ESSENTIAL HYPERTENSION: ICD-10-CM

## 2024-12-09 DIAGNOSIS — H69.91 EUSTACHIAN TUBE DYSFUNCTION, RIGHT: ICD-10-CM

## 2024-12-09 DIAGNOSIS — D50.9 IRON DEFICIENCY ANEMIA, UNSPECIFIED IRON DEFICIENCY ANEMIA TYPE: ICD-10-CM

## 2024-12-09 LAB
ALBUMIN SERPL BCP-MCNC: 3.8 G/DL (ref 3.4–5)
ALP SERPL-CCNC: 120 U/L (ref 33–110)
ALT SERPL W P-5'-P-CCNC: 23 U/L (ref 7–45)
ANION GAP SERPL CALC-SCNC: 8 MMOL/L (ref 10–20)
AST SERPL W P-5'-P-CCNC: 18 U/L (ref 9–39)
BILIRUB SERPL-MCNC: 0.5 MG/DL (ref 0–1.2)
BUN SERPL-MCNC: 8 MG/DL (ref 6–23)
CALCIUM SERPL-MCNC: 8.8 MG/DL (ref 8.6–10.3)
CHLORIDE SERPL-SCNC: 100 MMOL/L (ref 98–107)
CHOLEST SERPL-MCNC: 129 MG/DL (ref 0–199)
CHOLESTEROL/HDL RATIO: 1.9
CO2 SERPL-SCNC: 30 MMOL/L (ref 21–32)
CREAT SERPL-MCNC: 0.67 MG/DL (ref 0.5–1.05)
CREAT UR-MCNC: 69.9 MG/DL (ref 20–320)
EGFRCR SERPLBLD CKD-EPI 2021: >90 ML/MIN/1.73M*2
ERYTHROCYTE [DISTWIDTH] IN BLOOD BY AUTOMATED COUNT: 13.2 % (ref 11.5–14.5)
EST. AVERAGE GLUCOSE BLD GHB EST-MCNC: 146 MG/DL
FERRITIN SERPL-MCNC: 37 NG/ML (ref 8–150)
GLUCOSE SERPL-MCNC: 154 MG/DL (ref 74–99)
HBA1C MFR BLD: 6.7 %
HCT VFR BLD AUTO: 35.6 % (ref 36–46)
HDLC SERPL-MCNC: 67.5 MG/DL
HGB BLD-MCNC: 11.8 G/DL (ref 12–16)
IRON SERPL-MCNC: 99 UG/DL (ref 35–150)
LDLC SERPL CALC-MCNC: 53 MG/DL
MCH RBC QN AUTO: 28.3 PG (ref 26–34)
MCHC RBC AUTO-ENTMCNC: 33.1 G/DL (ref 32–36)
MCV RBC AUTO: 85 FL (ref 80–100)
MICROALBUMIN UR-MCNC: <7 MG/L
MICROALBUMIN/CREAT UR: NORMAL MG/G{CREAT}
NON HDL CHOLESTEROL: 62 MG/DL (ref 0–149)
NRBC BLD-RTO: 0 /100 WBCS (ref 0–0)
PLATELET # BLD AUTO: 245 X10*3/UL (ref 150–450)
POTASSIUM SERPL-SCNC: 3.7 MMOL/L (ref 3.5–5.3)
PROT SERPL-MCNC: 6.5 G/DL (ref 6.4–8.2)
RBC # BLD AUTO: 4.17 X10*6/UL (ref 4–5.2)
SODIUM SERPL-SCNC: 134 MMOL/L (ref 136–145)
TRIGL SERPL-MCNC: 41 MG/DL (ref 0–149)
VLDL: 8 MG/DL (ref 0–40)
WBC # BLD AUTO: 3.7 X10*3/UL (ref 4.4–11.3)

## 2024-12-09 PROCEDURE — 82728 ASSAY OF FERRITIN: CPT

## 2024-12-09 PROCEDURE — 85027 COMPLETE CBC AUTOMATED: CPT

## 2024-12-09 PROCEDURE — 80061 LIPID PANEL: CPT

## 2024-12-09 PROCEDURE — 83036 HEMOGLOBIN GLYCOSYLATED A1C: CPT

## 2024-12-09 PROCEDURE — 83540 ASSAY OF IRON: CPT

## 2024-12-09 PROCEDURE — 80053 COMPREHEN METABOLIC PANEL: CPT

## 2024-12-09 PROCEDURE — 82570 ASSAY OF URINE CREATININE: CPT

## 2024-12-09 PROCEDURE — 36415 COLL VENOUS BLD VENIPUNCTURE: CPT

## 2024-12-09 PROCEDURE — 82043 UR ALBUMIN QUANTITATIVE: CPT

## 2024-12-09 RX ORDER — FLUTICASONE PROPIONATE 50 MCG
SPRAY, SUSPENSION (ML) NASAL
Qty: 48 G | Refills: 0 | Status: SHIPPED | OUTPATIENT
Start: 2024-12-09

## 2024-12-10 ENCOUNTER — HOSPITAL ENCOUNTER (OUTPATIENT)
Dept: RADIOLOGY | Facility: HOSPITAL | Age: 59
Discharge: HOME | End: 2024-12-10
Payer: COMMERCIAL

## 2024-12-10 VITALS — BODY MASS INDEX: 29.03 KG/M2 | WEIGHT: 185 LBS | HEIGHT: 67 IN

## 2024-12-10 DIAGNOSIS — Z12.31 SCREENING MAMMOGRAM FOR BREAST CANCER: ICD-10-CM

## 2024-12-10 LAB
CYTOLOGY CMNT CVX/VAG CYTO-IMP: NORMAL
HPV HR 12 DNA GENITAL QL NAA+PROBE: NEGATIVE
HPV HR GENOTYPES PNL CVX NAA+PROBE: NEGATIVE
HPV16 DNA SPEC QL NAA+PROBE: NEGATIVE
HPV18 DNA SPEC QL NAA+PROBE: NEGATIVE
LAB AP HPV GENOTYPE QUESTION: YES
LAB AP HPV HR: NORMAL
LABORATORY COMMENT REPORT: NORMAL
PATH REPORT.TOTAL CANCER: NORMAL

## 2024-12-10 PROCEDURE — 77067 SCR MAMMO BI INCL CAD: CPT | Performed by: RADIOLOGY

## 2024-12-10 PROCEDURE — 77067 SCR MAMMO BI INCL CAD: CPT

## 2024-12-10 PROCEDURE — 77063 BREAST TOMOSYNTHESIS BI: CPT | Performed by: RADIOLOGY

## 2024-12-11 ENCOUNTER — APPOINTMENT (OUTPATIENT)
Dept: PRIMARY CARE | Facility: CLINIC | Age: 59
End: 2024-12-11
Payer: COMMERCIAL

## 2024-12-11 VITALS
OXYGEN SATURATION: 98 % | BODY MASS INDEX: 28.79 KG/M2 | WEIGHT: 183.8 LBS | SYSTOLIC BLOOD PRESSURE: 138 MMHG | DIASTOLIC BLOOD PRESSURE: 80 MMHG | TEMPERATURE: 97.5 F | HEART RATE: 80 BPM

## 2024-12-11 DIAGNOSIS — L30.1 DYSHIDROTIC DERMATITIS: Primary | ICD-10-CM

## 2024-12-11 DIAGNOSIS — D50.9 IRON DEFICIENCY ANEMIA, UNSPECIFIED IRON DEFICIENCY ANEMIA TYPE: ICD-10-CM

## 2024-12-11 DIAGNOSIS — E10.65 TYPE 1 DIABETES MELLITUS WITH HYPERGLYCEMIA (MULTI): ICD-10-CM

## 2024-12-11 DIAGNOSIS — E78.2 MIXED HYPERLIPIDEMIA: ICD-10-CM

## 2024-12-11 DIAGNOSIS — E55.9 VITAMIN D DEFICIENCY: ICD-10-CM

## 2024-12-11 DIAGNOSIS — F32.1 DEPRESSION, MAJOR, SINGLE EPISODE, MODERATE (MULTI): ICD-10-CM

## 2024-12-11 DIAGNOSIS — Z23 ENCOUNTER FOR IMMUNIZATION: ICD-10-CM

## 2024-12-11 DIAGNOSIS — I10 BENIGN ESSENTIAL HYPERTENSION: ICD-10-CM

## 2024-12-11 PROBLEM — H69.91 EUSTACHIAN TUBE DYSFUNCTION, RIGHT: Status: RESOLVED | Noted: 2024-11-11 | Resolved: 2024-12-11

## 2024-12-11 PROBLEM — R19.01 ABDOMINAL WALL MASS OF RIGHT UPPER QUADRANT: Status: RESOLVED | Noted: 2023-12-11 | Resolved: 2024-12-11

## 2024-12-11 PROCEDURE — 90656 IIV3 VACC NO PRSV 0.5 ML IM: CPT | Performed by: FAMILY MEDICINE

## 2024-12-11 PROCEDURE — 90471 IMMUNIZATION ADMIN: CPT | Performed by: FAMILY MEDICINE

## 2024-12-11 PROCEDURE — 3062F POS MACROALBUMINURIA REV: CPT | Performed by: FAMILY MEDICINE

## 2024-12-11 PROCEDURE — 3079F DIAST BP 80-89 MM HG: CPT | Performed by: FAMILY MEDICINE

## 2024-12-11 PROCEDURE — 3044F HG A1C LEVEL LT 7.0%: CPT | Performed by: FAMILY MEDICINE

## 2024-12-11 PROCEDURE — 3075F SYST BP GE 130 - 139MM HG: CPT | Performed by: FAMILY MEDICINE

## 2024-12-11 PROCEDURE — 4010F ACE/ARB THERAPY RXD/TAKEN: CPT | Performed by: FAMILY MEDICINE

## 2024-12-11 PROCEDURE — 99214 OFFICE O/P EST MOD 30 MIN: CPT | Performed by: FAMILY MEDICINE

## 2024-12-11 PROCEDURE — 3048F LDL-C <100 MG/DL: CPT | Performed by: FAMILY MEDICINE

## 2024-12-11 PROCEDURE — 1036F TOBACCO NON-USER: CPT | Performed by: FAMILY MEDICINE

## 2024-12-11 RX ORDER — TRIAMCINOLONE ACETONIDE 1 MG/G
CREAM TOPICAL 2 TIMES DAILY
Qty: 80 G | Refills: 0 | Status: SHIPPED | OUTPATIENT
Start: 2024-12-11

## 2024-12-11 ASSESSMENT — ENCOUNTER SYMPTOMS
CONFUSION: 0
ARTHRALGIAS: 0
SHORTNESS OF BREATH: 0
FEVER: 0
CHILLS: 0
PALPITATIONS: 0
CHEST TIGHTNESS: 0
ABDOMINAL PAIN: 0

## 2024-12-11 NOTE — ASSESSMENT & PLAN NOTE
Over-the-counter moisturizing creams prescription for Kenalog 0.1% cream thin layer 1-2 times daily to affected area as needed

## 2024-12-11 NOTE — PROGRESS NOTES
Subjective   Patient ID: Kinsey Cao is a 59 y.o. female who presents for Follow-up.    HPI   Patient today for follow-up of ongoing healthcare issues and review of recent lab work overalls been feeling good.  She did recently have episode of mouth sores went to urgent care diagnosed with a viral related issue placed on a round of Valtrex she says she is doing much better now she has been off the medication for least 4 days.  She is complaining of a pruritic rash mainly on her lateral torso both right and left sides it tends to occur in the area where she places her Dexcom adhesive when she removes it she gets an itchy rash for short period of time  Review of Systems   Constitutional:  Negative for chills and fever.   HENT:  Negative for congestion and ear pain.    Eyes:  Negative for visual disturbance.   Respiratory:  Negative for chest tightness and shortness of breath.    Cardiovascular:  Negative for chest pain and palpitations.   Gastrointestinal:  Negative for abdominal pain.   Musculoskeletal:  Negative for arthralgias.   Skin:  Positive for rash. Negative for pallor.   Psychiatric/Behavioral:  Negative for confusion.        Objective   /80 (BP Location: Left arm, Patient Position: Sitting, BP Cuff Size: Adult)   Pulse 80   Temp 36.4 °C (97.5 °F)   Wt 83.4 kg (183 lb 12.8 oz)   SpO2 98%   BMI 28.79 kg/m²     Physical Exam  Vitals and nursing note reviewed.   Constitutional:       General: She is not in acute distress.     Appearance: Normal appearance. She is not ill-appearing.   HENT:      Head: Normocephalic and atraumatic.      Right Ear: Tympanic membrane, ear canal and external ear normal.      Left Ear: Tympanic membrane, ear canal and external ear normal.      Mouth/Throat:      Pharynx: Oropharynx is clear.   Eyes:      Extraocular Movements: Extraocular movements intact.   Cardiovascular:      Rate and Rhythm: Normal rate and regular rhythm.      Pulses: Normal pulses.      Heart  sounds: Normal heart sounds.   Pulmonary:      Effort: Pulmonary effort is normal.      Breath sounds: Normal breath sounds.   Abdominal:      General: Abdomen is flat. Bowel sounds are normal.      Palpations: Abdomen is soft.      Tenderness: There is no abdominal tenderness.   Musculoskeletal:         General: Normal range of motion.      Cervical back: Neck supple.   Skin:     General: Skin is warm.      Findings: Rash present.      Comments: Mild erythematous rash right and left lateral torso does she says it is right in the area where she tends to put her diabetic device applies it with an adhesive dressing.   Neurological:      Mental Status: She is alert and oriented to person, place, and time. Mental status is at baseline.   Psychiatric:         Mood and Affect: Mood normal.       Recent Results (from the past 6 weeks)   THINPREP PAP TEST (>30)    Collection Time: 11/21/24 11:13 AM   Result Value Ref Range    Case Report       Gynecologic Cytology                              Case: [id number]                                   Authorizing Provider:  Ele Mcduffie MD         Collected:           11/21/2024 1113              Ordering Location:     [facility]      Received:            11/21/2024 1113                                     Center                                                                       First Screen:          CITLALLI Moss                                                              Rescreen:              CITLALLI Najera                                                          Specimen:    ThinPrep Liquid-Based Pap-Imaging System Screen, CERVIX, SCREENING                         Final Cytological Interpretation           A. THINPREP PAP CERVIX, SCREENING -     Specimen Adequacy  Satisfactory for evaluation; endocervical/transformation zone component is present  Quality Indicator: Partially obscuring inflammation    General Categorization  Negative for intraepithelial  lesion or malignancy.    Descriptive Interpretation  Negative for intraepithelial lesion or malignancy                    Slide(s) initially screened by CITLALLI Dong at [facility] [address] [address]  QC review performed by CITLALLI Najera at [facility] [facility] [address]  By the signature on this report, the individual or group listed as making the Final Interpretation/Diagnosis certifies that they have reviewed this case.       ThinPrep Imaging System       This specimen has been analyzed by the [employer] Biotechnology[employer] Imaging System ([employer] an automated imaging and review system, which assists the laboratory in evaluating cells on ThinPrep Pap tests. Following automated imaging, selected fields from every slide were reviewed by a cytotechnologist and/or pathologist.        Educational Note       Cervical cytology is a screening procedure primarily for squamous cancers and precursors and has associated false-negative and false-positives results as evidenced by published data. Your patient's test should be interpreted in this context, together with the patient's history and clinical findings. Regular sampling and follow-up of unexplained clinical signs and symptoms are recommended to minimize false negative results.      Perform HPV HR test? Always (all interpretations)     Include HPV Genotype? Yes    HPV DNA High Risk With Genotype    Collection Time: 11/21/24 11:13 AM   Result Value Ref Range    HPV, high-risk Negative Negative    HPV Type 16 DNA Negative Negative    HPV Type 18 DNA Negative Negative    HPV non-Type 16 or 18 DNA Negative Negative   POCT rapid strep A manually resulted    Collection Time: 12/01/24 10:51 AM   Result Value Ref Range    POC Rapid Strep Negative Negative   Albumin-Creatinine Ratio, Urine Random    Collection Time: 12/09/24 10:22 AM   Result Value Ref Range    Albumin, Urine Random <7.0 Not established mg/L    Creatinine,  Urine Random 69.9 20.0 - 320.0 mg/dL    Albumin/Creatinine Ratio     CBC    Collection Time: 12/09/24 10:22 AM   Result Value Ref Range    WBC 3.7 (L) 4.4 - 11.3 x10*3/uL    nRBC 0.0 0.0 - 0.0 /100 WBCs    RBC 4.17 4.00 - 5.20 x10*6/uL    Hemoglobin 11.8 (L) 12.0 - 16.0 g/dL    Hematocrit 35.6 (L) 36.0 - 46.0 %    MCV 85 80 - 100 fL    MCH 28.3 26.0 - 34.0 pg    MCHC 33.1 32.0 - 36.0 g/dL    RDW 13.2 11.5 - 14.5 %    Platelets 245 150 - 450 x10*3/uL   Comprehensive Metabolic Panel    Collection Time: 12/09/24 10:22 AM   Result Value Ref Range    Glucose 154 (H) 74 - 99 mg/dL    Sodium 134 (L) 136 - 145 mmol/L    Potassium 3.7 3.5 - 5.3 mmol/L    Chloride 100 98 - 107 mmol/L    Bicarbonate 30 21 - 32 mmol/L    Anion Gap 8 (L) 10 - 20 mmol/L    Urea Nitrogen 8 6 - 23 mg/dL    Creatinine 0.67 0.50 - 1.05 mg/dL    eGFR >90 >60 mL/min/1.73m*2    Calcium 8.8 8.6 - 10.3 mg/dL    Albumin 3.8 3.4 - 5.0 g/dL    Alkaline Phosphatase 120 (H) 33 - 110 U/L    Total Protein 6.5 6.4 - 8.2 g/dL    AST 18 9 - 39 U/L    Bilirubin, Total 0.5 0.0 - 1.2 mg/dL    ALT 23 7 - 45 U/L   Hemoglobin A1C    Collection Time: 12/09/24 10:22 AM   Result Value Ref Range    Hemoglobin A1C 6.7 (H) See comment %    Estimated Average Glucose 146 Not Established mg/dL   Ferritin    Collection Time: 12/09/24 10:22 AM   Result Value Ref Range    Ferritin 37 8 - 150 ng/mL   Iron    Collection Time: 12/09/24 10:22 AM   Result Value Ref Range    Iron 99 35 - 150 ug/dL   Lipid Panel    Collection Time: 12/09/24 10:22 AM   Result Value Ref Range    Cholesterol 129 0 - 199 mg/dL    HDL-Cholesterol 67.5 mg/dL    Cholesterol/HDL Ratio 1.9     LDL Calculated 53 <=99 mg/dL    VLDL 8 0 - 40 mg/dL    Triglycerides 41 0 - 149 mg/dL    Non HDL Cholesterol 62 0 - 149 mg/dL     Recent labs reviewed overall numbers are stable with nice improvement in A1c she will continue current medications work on diet exercise and weight control    Flu shot x 1    Follow through with  endocrinology    Kenalog 0.1% cream as directed as well as using mild soap for bathing and over-the-counter moisturizing cream such as Cetaphil or CeraVe    Return to office 4 months with repeat fasting lab      Assessment/Plan   Problem List Items Addressed This Visit             ICD-10-CM    Benign essential hypertension I10     Stable continue current treatment         Relevant Orders    Follow Up In Primary Care - Established    CBC    Comprehensive Metabolic Panel    Depression, major, single episode, moderate (Multi) F32.1     Continue fluoxetine 40 mg daily         Dyshidrotic dermatitis - Primary L30.1     Over-the-counter moisturizing creams prescription for Kenalog 0.1% cream thin layer 1-2 times daily to affected area as needed         Relevant Medications    triamcinolone (Kenalog) 0.1 % cream    Hyperlipidemia E78.5     Continue Crestor 10 mg daily         Relevant Orders    Follow Up In Primary Care - Established    Comprehensive Metabolic Panel    Lipid Panel    Iron deficiency anemia D50.9     Stable continue to monitor         Relevant Orders    Follow Up In Primary Care - Established    CBC    Ferritin    Comprehensive Metabolic Panel    Iron    Type 1 diabetes mellitus E10.9     A1c down to 6.7% the best it has been in quite some time continue current treatment and follow with endocrinology         Relevant Orders    Follow Up In Primary Care - Established    Comprehensive Metabolic Panel    Hemoglobin A1C    Vitamin D deficiency E55.9     Continue to monitor and supplement         Relevant Orders    Vitamin D 25-Hydroxy,Total (for eval of Vitamin D levels)    Encounter for immunization Z23     Flu shot         Relevant Orders    Flu vaccine, trivalent, preservative free, age 6 months and greater (Fluarix/Fluzone/Flulaval) (Completed)

## 2024-12-11 NOTE — ASSESSMENT & PLAN NOTE
A1c down to 6.7% the best it has been in quite some time continue current treatment and follow with endocrinology

## 2025-01-08 ENCOUNTER — APPOINTMENT (OUTPATIENT)
Dept: ORTHOPEDIC SURGERY | Facility: CLINIC | Age: 60
End: 2025-01-08
Payer: COMMERCIAL

## 2025-01-08 VITALS — WEIGHT: 185 LBS | HEIGHT: 67 IN | BODY MASS INDEX: 29.03 KG/M2

## 2025-01-08 DIAGNOSIS — M65.331 TRIGGER MIDDLE FINGER OF RIGHT HAND: Primary | ICD-10-CM

## 2025-01-08 PROCEDURE — 99214 OFFICE O/P EST MOD 30 MIN: CPT | Performed by: ORTHOPAEDIC SURGERY

## 2025-01-08 PROCEDURE — 1036F TOBACCO NON-USER: CPT | Performed by: ORTHOPAEDIC SURGERY

## 2025-01-08 PROCEDURE — 4010F ACE/ARB THERAPY RXD/TAKEN: CPT | Performed by: ORTHOPAEDIC SURGERY

## 2025-01-08 PROCEDURE — 3008F BODY MASS INDEX DOCD: CPT | Performed by: ORTHOPAEDIC SURGERY

## 2025-01-08 ASSESSMENT — PAIN - FUNCTIONAL ASSESSMENT: PAIN_FUNCTIONAL_ASSESSMENT: NO/DENIES PAIN

## 2025-01-08 NOTE — PROGRESS NOTES
F/U Right long finger trigger. She was given a RLF A1 PULLEY CSI 11/20/2024 which is still helping. No concerns.

## 2025-01-08 NOTE — PROGRESS NOTES
59 y.o. female presents today for follow-up right long finger catching clicking locking and pain. The patient reports symptoms for the past few months, getting worse. Pain is controlled. Patient reports no numbness and tingling.  Reports no previous surgeries or trauma to the area.  Reports pain worse with use, better at rest.   Pain dull ache, sharp at times.  Type II diabetic on insulin.  History of multiple triggers in the past.  Doing much better today, symptoms much improved in the finger, no more catching clicking locking or pain today.    Review of Systems    Constitutional: see HPI, no fever, no chills, not feeling tired, no significant weight gain or weight loss.   Eyes: No vision changes  ENT: no nosebleeds.   Cardiovascular: no chest pain.   Respiratory: no shortness of breath and no cough.   Gastrointestinal: no abdominal pain, no nausea, no vomiting and no diarrhea.   Musculoskeletal: per HPI  Neurological: no headache, no gait disturbances  Psychiatric: no depression and no sleep disturbances.   Endocrine: no muscle weakness and no muscle cramps.   Hematologic/Lymphatic: no swollen glands and no tendency for easy bruising or excessive swelling.     Patient's past medical history, past surgical history, allergies, and medications have been reviewed unless otherwise noted in the chart.     Trigger Exam  Digit: Right long finger A1 inspection:  no evidence of infection, no erythema, no edema, no ecchymosis, Palpation:  compartments are soft, NTTP A1 pulley Range of Motion:  full composite finger flexion, Stability:  no finger instability, Strength:  5/5 strength with flexion and extension, Skin:  intact, Vascular:  capillary refill <2 seconds distally, Sensation:  sensation intact to light touch distally, Other:  no pain with palpation or motion proximally in the wrist.      Constitutional   General appearance: Alert and in no acute distress. Well developed, well nourished.    Eyes   External Eye,  Conjunctiva and lids: Normal external exam - pupils were equal in size, round, reactive to light (PERRL) with normal accommodation and extraocular movements intact (EOMI).   Ears, Nose, Mouth, and Throat   Hearing: Normal.   Neck   Neck: No neck mass was observed. Supple.   Pulmonary   Respiratory effort: No respiratory distress.   Cardiovascular   Examination of extremities: No peripheral edema.   Psychiatric   Judgment and insight: Intact.   Orientation to person, place, and time: Alert and oriented x 3.       Mood and affect: Normal.      Right long finger trigger  Based on the history, physical exam and imaging studies above, the patient's presentation is consistent with the above diagnosis.  I had a long discussion with the patient regarding their presentation and the treatment options.  We discussed initial nonoperative versus operative management options as well as potential further diagnostic imaging.  We again discussed her treatment options going forward along with their associated risks and benefits. After thorough discussion, the patient has elected to proceed with conservative management. All questions were answered to the patients satisfaction who seems satisfied with the plan.  They will call the office with any questions/concerns.     Follow-up 8 weeks prn no x-ray

## 2025-01-22 ENCOUNTER — TELEPHONE (OUTPATIENT)
Dept: PRIMARY CARE | Facility: CLINIC | Age: 60
End: 2025-01-22
Payer: COMMERCIAL

## 2025-01-22 DIAGNOSIS — F41.1 GENERALIZED ANXIETY DISORDER: ICD-10-CM

## 2025-01-22 RX ORDER — CLONAZEPAM 0.5 MG/1
TABLET ORAL
Qty: 45 TABLET | Refills: 1 | Status: SHIPPED | OUTPATIENT
Start: 2025-01-22

## 2025-01-22 NOTE — TELEPHONE ENCOUNTER
Rx Refill Request Telephone Encounter    Name:  Kinsey Cao  :  [id number]  Medication Name:    clonazePAM (KlonoPIN) 0.5 mg tablet   Sig: Half a tab in the a.m. and 1 pill at bedtime as needed anxiety        Specific Pharmacy location:  [facility]/pharmacy #[id number] [place] [address]   Date of last appointment:  24  Date of next appointment:  25  Best number to reach patient: [phone]

## 2025-01-23 DIAGNOSIS — H69.91 EUSTACHIAN TUBE DYSFUNCTION, RIGHT: ICD-10-CM

## 2025-01-23 RX ORDER — FLUTICASONE PROPIONATE 50 MCG
1 SPRAY, SUSPENSION (ML) NASAL DAILY
Qty: 48 G | Refills: 0 | Status: SHIPPED | OUTPATIENT
Start: 2025-01-23

## 2025-01-23 RX ORDER — FLUTICASONE PROPIONATE 50 MCG
SPRAY, SUSPENSION (ML) NASAL
Qty: 48 ML | Refills: 0 | OUTPATIENT
Start: 2025-01-23

## 2025-04-11 LAB
25(OH)D3+25(OH)D2 SERPL-MCNC: 60 NG/ML (ref 30–100)
ALBUMIN SERPL-MCNC: 4 G/DL (ref 3.6–5.1)
ALP SERPL-CCNC: 109 U/L (ref 37–153)
ALT SERPL-CCNC: 18 U/L (ref 6–29)
ANION GAP SERPL CALCULATED.4IONS-SCNC: 6 MMOL/L (CALC) (ref 7–17)
AST SERPL-CCNC: 20 U/L (ref 10–35)
BILIRUB SERPL-MCNC: 0.5 MG/DL (ref 0.2–1.2)
BUN SERPL-MCNC: 9 MG/DL (ref 7–25)
CALCIUM SERPL-MCNC: 9.2 MG/DL (ref 8.6–10.4)
CHLORIDE SERPL-SCNC: 98 MMOL/L (ref 98–110)
CHOLEST SERPL-MCNC: 132 MG/DL
CHOLEST/HDLC SERPL: 2.1 (CALC)
CO2 SERPL-SCNC: 30 MMOL/L (ref 20–32)
CREAT SERPL-MCNC: 0.63 MG/DL (ref 0.5–1.03)
EGFRCR SERPLBLD CKD-EPI 2021: 103 ML/MIN/1.73M2
ERYTHROCYTE [DISTWIDTH] IN BLOOD BY AUTOMATED COUNT: 13 % (ref 11–15)
EST. AVERAGE GLUCOSE BLD GHB EST-MCNC: 180 MG/DL
EST. AVERAGE GLUCOSE BLD GHB EST-SCNC: 10 MMOL/L
FERRITIN SERPL-MCNC: 27 NG/ML (ref 16–232)
GLUCOSE SERPL-MCNC: 202 MG/DL (ref 65–99)
HBA1C MFR BLD: 7.9 % OF TOTAL HGB
HCT VFR BLD AUTO: 38.4 % (ref 35–45)
HDLC SERPL-MCNC: 63 MG/DL
HGB BLD-MCNC: 12.6 G/DL (ref 11.7–15.5)
IRON SERPL-MCNC: 89 MCG/DL (ref 45–160)
LDLC SERPL CALC-MCNC: 56 MG/DL (CALC)
MCH RBC QN AUTO: 28.3 PG (ref 27–33)
MCHC RBC AUTO-ENTMCNC: 32.8 G/DL (ref 32–36)
MCV RBC AUTO: 86.3 FL (ref 80–100)
NONHDLC SERPL-MCNC: 69 MG/DL (CALC)
PLATELET # BLD AUTO: 282 THOUSAND/UL (ref 140–400)
PMV BLD REES-ECKER: 9.8 FL (ref 7.5–12.5)
POTASSIUM SERPL-SCNC: 3.5 MMOL/L (ref 3.5–5.3)
PROT SERPL-MCNC: 7 G/DL (ref 6.1–8.1)
RBC # BLD AUTO: 4.45 MILLION/UL (ref 3.8–5.1)
SODIUM SERPL-SCNC: 134 MMOL/L (ref 135–146)
TRIGL SERPL-MCNC: 57 MG/DL
WBC # BLD AUTO: 3.7 THOUSAND/UL (ref 3.8–10.8)

## 2025-04-16 ENCOUNTER — APPOINTMENT (OUTPATIENT)
Dept: PRIMARY CARE | Facility: CLINIC | Age: 60
End: 2025-04-16
Payer: COMMERCIAL

## 2025-04-16 VITALS
SYSTOLIC BLOOD PRESSURE: 130 MMHG | DIASTOLIC BLOOD PRESSURE: 83 MMHG | TEMPERATURE: 97.1 F | WEIGHT: 185 LBS | RESPIRATION RATE: 14 BRPM | OXYGEN SATURATION: 98 % | BODY MASS INDEX: 28.98 KG/M2 | HEART RATE: 67 BPM

## 2025-04-16 DIAGNOSIS — J30.9 ALLERGIC RHINITIS, UNSPECIFIED SEASONALITY, UNSPECIFIED TRIGGER: ICD-10-CM

## 2025-04-16 DIAGNOSIS — I10 BENIGN ESSENTIAL HYPERTENSION: ICD-10-CM

## 2025-04-16 DIAGNOSIS — E55.9 VITAMIN D DEFICIENCY: ICD-10-CM

## 2025-04-16 DIAGNOSIS — D50.9 IRON DEFICIENCY ANEMIA, UNSPECIFIED IRON DEFICIENCY ANEMIA TYPE: ICD-10-CM

## 2025-04-16 DIAGNOSIS — E10.65 TYPE 1 DIABETES MELLITUS WITH HYPERGLYCEMIA (MULTI): Primary | ICD-10-CM

## 2025-04-16 DIAGNOSIS — F41.1 GENERALIZED ANXIETY DISORDER: ICD-10-CM

## 2025-04-16 DIAGNOSIS — H69.91 EUSTACHIAN TUBE DYSFUNCTION, RIGHT: ICD-10-CM

## 2025-04-16 DIAGNOSIS — E87.6 HYPOKALEMIA: ICD-10-CM

## 2025-04-16 DIAGNOSIS — L30.1 DYSHIDROTIC DERMATITIS: ICD-10-CM

## 2025-04-16 DIAGNOSIS — Z79.899 CONTROLLED SUBSTANCE AGREEMENT SIGNED: ICD-10-CM

## 2025-04-16 DIAGNOSIS — E78.2 MIXED HYPERLIPIDEMIA: ICD-10-CM

## 2025-04-16 DIAGNOSIS — F32.1 DEPRESSION, MAJOR, SINGLE EPISODE, MODERATE (MULTI): ICD-10-CM

## 2025-04-16 PROBLEM — M65.332 TRIGGER MIDDLE FINGER OF LEFT HAND: Status: RESOLVED | Noted: 2023-02-01 | Resolved: 2025-04-16

## 2025-04-16 PROCEDURE — 80305 DRUG TEST PRSMV DIR OPT OBS: CPT | Performed by: FAMILY MEDICINE

## 2025-04-16 PROCEDURE — 4010F ACE/ARB THERAPY RXD/TAKEN: CPT | Performed by: FAMILY MEDICINE

## 2025-04-16 PROCEDURE — 3075F SYST BP GE 130 - 139MM HG: CPT | Performed by: FAMILY MEDICINE

## 2025-04-16 PROCEDURE — 99214 OFFICE O/P EST MOD 30 MIN: CPT | Performed by: FAMILY MEDICINE

## 2025-04-16 PROCEDURE — 3079F DIAST BP 80-89 MM HG: CPT | Performed by: FAMILY MEDICINE

## 2025-04-16 PROCEDURE — 1036F TOBACCO NON-USER: CPT | Performed by: FAMILY MEDICINE

## 2025-04-16 RX ORDER — FLUOXETINE HYDROCHLORIDE 40 MG/1
40 CAPSULE ORAL DAILY
Qty: 90 CAPSULE | Refills: 1 | Status: SHIPPED | OUTPATIENT
Start: 2025-04-16 | End: 2026-04-16

## 2025-04-16 RX ORDER — TRIAMCINOLONE ACETONIDE 1 MG/G
CREAM TOPICAL 2 TIMES DAILY
Qty: 80 G | Refills: 0 | Status: SHIPPED | OUTPATIENT
Start: 2025-04-16

## 2025-04-16 RX ORDER — POTASSIUM CHLORIDE 20 MEQ/1
20 TABLET, EXTENDED RELEASE ORAL DAILY
Qty: 90 TABLET | Refills: 1 | Status: SHIPPED | OUTPATIENT
Start: 2025-04-16

## 2025-04-16 RX ORDER — BLOOD-GLUCOSE SENSOR
EACH MISCELLANEOUS
COMMUNITY
Start: 2025-04-07

## 2025-04-16 RX ORDER — HYDROCHLOROTHIAZIDE 25 MG/1
25 TABLET ORAL DAILY
Qty: 90 TABLET | Refills: 1 | Status: SHIPPED | OUTPATIENT
Start: 2025-04-16

## 2025-04-16 RX ORDER — AMLODIPINE BESYLATE 5 MG/1
5 TABLET ORAL DAILY
Qty: 90 TABLET | Refills: 1 | Status: SHIPPED | OUTPATIENT
Start: 2025-04-16

## 2025-04-16 RX ORDER — INSULIN PMP CART,AUT,G6/7,CNTR
EACH SUBCUTANEOUS
COMMUNITY
Start: 2025-03-10

## 2025-04-16 RX ORDER — INSULIN PMP CART,AUT,G6/7,CNTR
EACH SUBCUTANEOUS
COMMUNITY
Start: 2025-02-12

## 2025-04-16 RX ORDER — CLONAZEPAM 0.5 MG/1
TABLET ORAL
Qty: 45 TABLET | Refills: 1 | Status: SHIPPED | OUTPATIENT
Start: 2025-04-16

## 2025-04-16 RX ORDER — FLUTICASONE PROPIONATE 50 MCG
1 SPRAY, SUSPENSION (ML) NASAL DAILY
Qty: 48 G | Refills: 0 | Status: SHIPPED | OUTPATIENT
Start: 2025-04-16

## 2025-04-16 RX ORDER — MONTELUKAST SODIUM 10 MG/1
10 TABLET ORAL NIGHTLY
Qty: 90 TABLET | Refills: 1 | Status: SHIPPED | OUTPATIENT
Start: 2025-04-16

## 2025-04-16 RX ORDER — TELMISARTAN 80 MG/1
80 TABLET ORAL DAILY
Qty: 90 TABLET | Refills: 1 | Status: SHIPPED | OUTPATIENT
Start: 2025-04-16

## 2025-04-16 RX ORDER — CARVEDILOL 12.5 MG/1
12.5 TABLET ORAL 2 TIMES DAILY
Qty: 180 TABLET | Refills: 1 | Status: SHIPPED | OUTPATIENT
Start: 2025-04-16 | End: 2026-04-16

## 2025-04-16 ASSESSMENT — ENCOUNTER SYMPTOMS
ARTHRALGIAS: 0
CHILLS: 0
CHEST TIGHTNESS: 0
ABDOMINAL PAIN: 0
PALPITATIONS: 0
SHORTNESS OF BREATH: 0
CONFUSION: 0
FEVER: 0

## 2025-04-16 ASSESSMENT — PATIENT HEALTH QUESTIONNAIRE - PHQ9
1. LITTLE INTEREST OR PLEASURE IN DOING THINGS: NOT AT ALL
SUM OF ALL RESPONSES TO PHQ9 QUESTIONS 1 AND 2: 0
2. FEELING DOWN, DEPRESSED OR HOPELESS: NOT AT ALL

## 2025-04-16 NOTE — PROGRESS NOTES
Subjective   Patient ID: Kinsey Cao is a 59 y.o. female who presents for Follow-up (4 month).    HPI patient today for follow-up of ongoing healthcare issues and review of recent lab work most ports that she is doing okay.  She ended up having to switch from the Dexcom to freestyle rao because she was having skin reaction from the adhesive.  She does not feel the freestyle is as accurate as the Dexcom.  She also mitts that she has not been sticking with her diet as strictly as she needs to be.  Emotionally she feels things are stable with the current medications and she would like to continue.    Review of Systems   Constitutional:  Negative for chills and fever.   HENT:  Negative for congestion and ear pain.    Eyes:  Negative for visual disturbance.   Respiratory:  Negative for chest tightness and shortness of breath.    Cardiovascular:  Negative for chest pain and palpitations.   Gastrointestinal:  Negative for abdominal pain.   Musculoskeletal:  Negative for arthralgias.   Skin:  Negative for pallor.   Psychiatric/Behavioral:  Negative for confusion.        Objective   /83   Pulse 67   Temp 36.2 °C (97.1 °F)   Resp 14   Wt 83.9 kg (185 lb)   SpO2 98%   BMI 28.98 kg/m²     Physical Exam  Vitals and nursing note reviewed.   Constitutional:       General: She is not in acute distress.     Appearance: Normal appearance. She is not ill-appearing.   HENT:      Head: Normocephalic and atraumatic.      Right Ear: Tympanic membrane, ear canal and external ear normal.      Left Ear: Tympanic membrane, ear canal and external ear normal.      Mouth/Throat:      Pharynx: Oropharynx is clear.   Eyes:      Extraocular Movements: Extraocular movements intact.   Cardiovascular:      Rate and Rhythm: Normal rate and regular rhythm.      Pulses: Normal pulses.      Heart sounds: Normal heart sounds.   Pulmonary:      Effort: Pulmonary effort is normal.      Breath sounds: Normal breath sounds.   Abdominal:       General: Abdomen is flat. Bowel sounds are normal.      Palpations: Abdomen is soft.      Tenderness: There is no abdominal tenderness.   Musculoskeletal:         General: Normal range of motion.      Cervical back: Neck supple.   Skin:     General: Skin is warm.   Neurological:      Mental Status: She is alert and oriented to person, place, and time. Mental status is at baseline.   Psychiatric:         Mood and Affect: Mood normal.       Recent Results (from the past 6 weeks)   Lipid Panel    Collection Time: 04/10/25  9:15 AM   Result Value Ref Range    CHOLESTEROL, TOTAL 132 <200 mg/dL    HDL CHOLESTEROL 63 > OR = 50 mg/dL    TRIGLYCERIDES 57 <150 mg/dL    LDL-CHOLESTEROL 56 mg/dL (calc)    CHOL/HDLC RATIO 2.1 <5.0 (calc)    NON HDL CHOLESTEROL 69 <130 mg/dL (calc)   Iron    Collection Time: 04/10/25  9:15 AM   Result Value Ref Range    IRON, TOTAL 89 45 - 160 mcg/dL   Comprehensive Metabolic Panel    Collection Time: 04/10/25  9:15 AM   Result Value Ref Range    GLUCOSE 202 (H) 65 - 99 mg/dL    UREA NITROGEN (BUN) 9 7 - 25 mg/dL    CREATININE 0.63 0.50 - 1.03 mg/dL    EGFR 103 > OR = 60 mL/min/1.73m2    SODIUM 134 (L) 135 - 146 mmol/L    POTASSIUM 3.5 3.5 - 5.3 mmol/L    CHLORIDE 98 98 - 110 mmol/L    CARBON DIOXIDE 30 20 - 32 mmol/L    ELECTROLYTE BALANCE 6 (L) 7 - 17 mmol/L (calc)    CALCIUM 9.2 8.6 - 10.4 mg/dL    PROTEIN, TOTAL 7.0 6.1 - 8.1 g/dL    ALBUMIN 4.0 3.6 - 5.1 g/dL    BILIRUBIN, TOTAL 0.5 0.2 - 1.2 mg/dL    ALKALINE PHOSPHATASE 109 37 - 153 U/L    AST 20 10 - 35 U/L    ALT 18 6 - 29 U/L   CBC    Collection Time: 04/10/25  9:15 AM   Result Value Ref Range    WHITE BLOOD CELL COUNT 3.7 (L) 3.8 - 10.8 Thousand/uL    RED BLOOD CELL COUNT 4.45 3.80 - 5.10 Million/uL    HEMOGLOBIN 12.6 11.7 - 15.5 g/dL    HEMATOCRIT 38.4 35.0 - 45.0 %    MCV 86.3 80.0 - 100.0 fL    MCH 28.3 27.0 - 33.0 pg    MCHC 32.8 32.0 - 36.0 g/dL    RDW 13.0 11.0 - 15.0 %    PLATELET COUNT 282 140 - 400 Thousand/uL    MPV 9.8 7.5  - 12.5 fL   Ferritin    Collection Time: 04/10/25  9:15 AM   Result Value Ref Range    FERRITIN 27 16 - 232 ng/mL   Vitamin D 25-Hydroxy,Total (for eval of Vitamin D levels)    Collection Time: 04/10/25  9:15 AM   Result Value Ref Range    VITAMIN D,25-OH,TOTAL,IA 60 30 - 100 ng/mL   Hemoglobin A1C    Collection Time: 04/10/25  9:15 AM   Result Value Ref Range    HEMOGLOBIN A1c 7.9 (H) <5.7 % of total Hgb    eAG (mg/dL) 180 mg/dL    eAG (mmol/L) 10.0 mmol/L     Recent labs reviewed with patient overall numbers are stable A1c is drifted back up though to 7.9% she will continue current medication and follow through with her endocrinologist.    Encourage better compliance with iron supplementation    Controlled substance agreement signed and urine drug screen completed    Necessary refills provided    CT cardiac scoring test given risk factors and family history of CAD.    Return to our office 4 months with repeat fasting labs    Assessment/Plan   Problem List Items Addressed This Visit           ICD-10-CM    Allergic rhinitis J30.9    Stable continue Allegra and Flonase         Relevant Medications    montelukast (Singulair) 10 mg tablet    Benign essential hypertension I10    Stable continue current antihypertensive medications         Relevant Medications    amLODIPine (Norvasc) 5 mg tablet    carvedilol (Coreg) 12.5 mg tablet    hydroCHLOROthiazide (HYDRODiuril) 25 mg tablet    telmisartan (MIcarDIS) 80 mg tablet    Other Relevant Orders    CT cardiac scoring wo IV contrast    Follow Up In Primary Care - Established    CBC    Comprehensive Metabolic Panel    TSH with reflex to Free T4 if abnormal    Depression, major, single episode, moderate (Multi) F32.1    Stable continue fluoxetine 40 mg daily         Relevant Medications    FLUoxetine (PROzac) 40 mg capsule    Other Relevant Orders    TSH with reflex to Free T4 if abnormal    Dyshidrotic dermatitis L30.1    Refill Kenalog 0.1% cream as directed          Relevant Medications    triamcinolone (Kenalog) 0.1 % cream    Generalized anxiety disorder F41.1    Stable continue fluoxetine 40 mg daily.  Also uses clonazepam 0.5 mg as directed.             Relevant Medications    clonazePAM (KlonoPIN) 0.5 mg tablet    Hyperlipidemia E78.5    Stable continue Crestor 10 mg daily         Relevant Orders    CT cardiac scoring wo IV contrast    Follow Up In Primary Care - Established    CBC    Comprehensive Metabolic Panel    Lipid Panel    TSH with reflex to Free T4 if abnormal    Hypokalemia E87.6    Continue to monitor and supplement         Relevant Medications    potassium chloride CR 20 mEq ER tablet    Iron deficiency anemia D50.9    Iron and ferritin normal range although ferritin is on the low end of the range.  She admits not taking her iron supplement consistently asked that she try to at least take it every Monday Wednesday and Friday.         Relevant Orders    Follow Up In Primary Care - Established    CBC    Comprehensive Metabolic Panel    Ferritin    Iron    Type 1 diabetes mellitus - Primary E10.9    A1c is gone up to 7.9% continue current treatment follow-up with her endocrinologist keep yearly diabetic eye exams         Relevant Orders    CT cardiac scoring wo IV contrast    Follow Up In Primary Care - Established    Albumin-Creatinine Ratio, Urine Random    Comprehensive Metabolic Panel    Hemoglobin A1C    TSH with reflex to Free T4 if abnormal    Vitamin D deficiency E55.9    Continue to monitor supplement as needed         Relevant Orders    Vitamin D 25-Hydroxy,Total (for eval of Vitamin D levels)    Controlled substance agreement signed Z79.899    Urine drug screen    I have personally reviewed the [facility] report.  I have considered the risks of abuse,dependence, addiction and diversion.  I believe it is clinically appropriate to prescribe this medication.            Relevant Orders    POCT waived urine drug screen manually resulted     Other Visit Diagnoses          Codes      Eustachian tube dysfunction, right     H69.91    Relevant Medications    fluticasone (Flonase) 50 mcg/actuation nasal spray

## 2025-04-16 NOTE — ASSESSMENT & PLAN NOTE
A1c is gone up to 7.9% continue current treatment follow-up with her endocrinologist keep yearly diabetic eye exams

## 2025-04-16 NOTE — ASSESSMENT & PLAN NOTE
Iron and ferritin normal range although ferritin is on the low end of the range.  She admits not taking her iron supplement consistently asked that she try to at least take it every Monday Wednesday and Friday.

## 2025-05-03 DIAGNOSIS — E78.2 MIXED HYPERLIPIDEMIA: ICD-10-CM

## 2025-05-07 ENCOUNTER — HOSPITAL ENCOUNTER (OUTPATIENT)
Dept: RADIOLOGY | Facility: HOSPITAL | Age: 60
Discharge: HOME | End: 2025-05-07
Payer: COMMERCIAL

## 2025-05-07 DIAGNOSIS — I10 BENIGN ESSENTIAL HYPERTENSION: ICD-10-CM

## 2025-05-07 DIAGNOSIS — E10.65 TYPE 1 DIABETES MELLITUS WITH HYPERGLYCEMIA (MULTI): ICD-10-CM

## 2025-05-07 DIAGNOSIS — E78.2 MIXED HYPERLIPIDEMIA: ICD-10-CM

## 2025-05-07 PROCEDURE — 75571 CT HRT W/O DYE W/CA TEST: CPT

## 2025-05-07 RX ORDER — ROSUVASTATIN CALCIUM 10 MG/1
10 TABLET, COATED ORAL DAILY
Qty: 90 TABLET | Refills: 1 | OUTPATIENT
Start: 2025-05-07

## 2025-05-16 RX ORDER — ROSUVASTATIN CALCIUM 10 MG/1
10 TABLET, COATED ORAL DAILY
Qty: 30 TABLET | Refills: 0 | Status: SHIPPED | OUTPATIENT
Start: 2025-05-16 | End: 2025-06-15

## 2025-05-16 NOTE — TELEPHONE ENCOUNTER
Patient of dr braden       Patient called in stating that she is out of medication and would like to know if a short term supply of rosuvastatin can be sent in due to dr merida not being in?       Please advise

## 2025-06-13 ENCOUNTER — TELEPHONE (OUTPATIENT)
Dept: PRIMARY CARE | Facility: CLINIC | Age: 60
End: 2025-06-13
Payer: COMMERCIAL

## 2025-06-13 DIAGNOSIS — E78.2 MIXED HYPERLIPIDEMIA: ICD-10-CM

## 2025-06-13 NOTE — TELEPHONE ENCOUNTER
Patient is requesting a refill on:    rosuvastatin (Crestor) 10 mg tablet    Route: Take 1 tablet (10 mg) by mouth once daily.     Pharmacy:   [facility]     Last office visit: 4/16/2025    Next office visit: 8/20/2025

## 2025-06-16 RX ORDER — ROSUVASTATIN CALCIUM 10 MG/1
10 TABLET, COATED ORAL DAILY
Qty: 30 TABLET | Refills: 2 | Status: SHIPPED | OUTPATIENT
Start: 2025-06-16 | End: 2025-09-14

## 2025-07-16 DIAGNOSIS — I10 BENIGN ESSENTIAL HYPERTENSION: ICD-10-CM

## 2025-07-16 DIAGNOSIS — E55.9 VITAMIN D DEFICIENCY: ICD-10-CM

## 2025-07-16 DIAGNOSIS — D50.9 IRON DEFICIENCY ANEMIA, UNSPECIFIED IRON DEFICIENCY ANEMIA TYPE: ICD-10-CM

## 2025-07-16 DIAGNOSIS — E78.2 MIXED HYPERLIPIDEMIA: ICD-10-CM

## 2025-07-16 DIAGNOSIS — E10.65 TYPE 1 DIABETES MELLITUS WITH HYPERGLYCEMIA (MULTI): ICD-10-CM

## 2025-07-16 DIAGNOSIS — F32.1 DEPRESSION, MAJOR, SINGLE EPISODE, MODERATE (MULTI): ICD-10-CM

## 2025-07-18 ENCOUNTER — TELEPHONE (OUTPATIENT)
Dept: PRIMARY CARE | Facility: CLINIC | Age: 60
End: 2025-07-18
Payer: COMMERCIAL

## 2025-07-18 DIAGNOSIS — F41.1 GENERALIZED ANXIETY DISORDER: ICD-10-CM

## 2025-07-18 NOTE — TELEPHONE ENCOUNTER
Kinsey called to get a refill on the following medication:    Clonazepam 0.5 mg 1/2 tablet in the am and 1 tablet at bedtime daily as needed for anxiety    Pharmacy: [facility]    Last seen: 4/16/2025    Future appointment: 8/20/2025    Ok to wait till Monday for Dr. Cooper to fill.

## 2025-07-21 RX ORDER — CLONAZEPAM 0.5 MG/1
TABLET ORAL
Qty: 45 TABLET | Refills: 1 | Status: SHIPPED | OUTPATIENT
Start: 2025-07-21

## 2025-08-16 LAB
25(OH)D3+25(OH)D2 SERPL-MCNC: 58 NG/ML (ref 30–100)
ALBUMIN SERPL-MCNC: 4 G/DL (ref 3.6–5.1)
ALBUMIN/CREAT UR: 2 MG/G CREAT
ALP SERPL-CCNC: 126 U/L (ref 37–153)
ALT SERPL-CCNC: 23 U/L (ref 6–29)
ANION GAP SERPL CALCULATED.4IONS-SCNC: 10 MMOL/L (CALC) (ref 7–17)
AST SERPL-CCNC: 22 U/L (ref 10–35)
BILIRUB SERPL-MCNC: 0.5 MG/DL (ref 0.2–1.2)
BUN SERPL-MCNC: 11 MG/DL (ref 7–25)
CALCIUM SERPL-MCNC: 9.6 MG/DL (ref 8.6–10.4)
CHLORIDE SERPL-SCNC: 96 MMOL/L (ref 98–110)
CHOLEST SERPL-MCNC: 146 MG/DL
CHOLEST/HDLC SERPL: 2.3 (CALC)
CO2 SERPL-SCNC: 28 MMOL/L (ref 20–32)
CREAT SERPL-MCNC: 0.66 MG/DL (ref 0.5–1.03)
CREAT UR-MCNC: 105 MG/DL (ref 20–275)
EGFRCR SERPLBLD CKD-EPI 2021: 102 ML/MIN/1.73M2
ERYTHROCYTE [DISTWIDTH] IN BLOOD BY AUTOMATED COUNT: 13.3 % (ref 11–15)
EST. AVERAGE GLUCOSE BLD GHB EST-MCNC: 192 MG/DL
EST. AVERAGE GLUCOSE BLD GHB EST-SCNC: 10.6 MMOL/L
FERRITIN SERPL-MCNC: 26 NG/ML (ref 16–232)
GLUCOSE SERPL-MCNC: 214 MG/DL (ref 65–99)
HBA1C MFR BLD: 8.3 %
HCT VFR BLD AUTO: 38.3 % (ref 35–45)
HDLC SERPL-MCNC: 63 MG/DL
HGB BLD-MCNC: 12.6 G/DL (ref 11.7–15.5)
IRON SERPL-MCNC: 106 MCG/DL (ref 45–160)
LDLC SERPL CALC-MCNC: 67 MG/DL (CALC)
MCH RBC QN AUTO: 28.4 PG (ref 27–33)
MCHC RBC AUTO-ENTMCNC: 32.9 G/DL (ref 32–36)
MCV RBC AUTO: 86.3 FL (ref 80–100)
MICROALBUMIN UR-MCNC: 0.2 MG/DL
NONHDLC SERPL-MCNC: 83 MG/DL (CALC)
PLATELET # BLD AUTO: 286 THOUSAND/UL (ref 140–400)
PMV BLD REES-ECKER: 9.5 FL (ref 7.5–12.5)
POTASSIUM SERPL-SCNC: 4 MMOL/L (ref 3.5–5.3)
PROT SERPL-MCNC: 6.9 G/DL (ref 6.1–8.1)
RBC # BLD AUTO: 4.44 MILLION/UL (ref 3.8–5.1)
SODIUM SERPL-SCNC: 134 MMOL/L (ref 135–146)
TRIGL SERPL-MCNC: 82 MG/DL
TSH SERPL-ACNC: 1.52 MIU/L (ref 0.4–4.5)
WBC # BLD AUTO: 4.8 THOUSAND/UL (ref 3.8–10.8)

## 2025-08-20 ENCOUNTER — APPOINTMENT (OUTPATIENT)
Dept: PRIMARY CARE | Facility: CLINIC | Age: 60
End: 2025-08-20
Payer: COMMERCIAL

## 2025-08-20 VITALS
HEIGHT: 67 IN | BODY MASS INDEX: 29.51 KG/M2 | TEMPERATURE: 97.2 F | OXYGEN SATURATION: 98 % | SYSTOLIC BLOOD PRESSURE: 138 MMHG | HEART RATE: 82 BPM | RESPIRATION RATE: 14 BRPM | WEIGHT: 188 LBS | DIASTOLIC BLOOD PRESSURE: 78 MMHG

## 2025-08-20 DIAGNOSIS — F32.1 DEPRESSION, MAJOR, SINGLE EPISODE, MODERATE (MULTI): Primary | ICD-10-CM

## 2025-08-20 DIAGNOSIS — E87.6 HYPOKALEMIA: ICD-10-CM

## 2025-08-20 DIAGNOSIS — F41.1 GENERALIZED ANXIETY DISORDER: ICD-10-CM

## 2025-08-20 DIAGNOSIS — D50.9 IRON DEFICIENCY ANEMIA, UNSPECIFIED IRON DEFICIENCY ANEMIA TYPE: ICD-10-CM

## 2025-08-20 DIAGNOSIS — E78.2 MIXED HYPERLIPIDEMIA: ICD-10-CM

## 2025-08-20 DIAGNOSIS — J30.9 ALLERGIC RHINITIS, UNSPECIFIED SEASONALITY, UNSPECIFIED TRIGGER: ICD-10-CM

## 2025-08-20 DIAGNOSIS — E10.65 TYPE 1 DIABETES MELLITUS WITH HYPERGLYCEMIA (MULTI): ICD-10-CM

## 2025-08-20 DIAGNOSIS — I10 BENIGN ESSENTIAL HYPERTENSION: ICD-10-CM

## 2025-08-20 DIAGNOSIS — H69.91 EUSTACHIAN TUBE DYSFUNCTION, RIGHT: ICD-10-CM

## 2025-08-20 PROCEDURE — 99214 OFFICE O/P EST MOD 30 MIN: CPT | Performed by: FAMILY MEDICINE

## 2025-08-20 PROCEDURE — 3008F BODY MASS INDEX DOCD: CPT | Performed by: FAMILY MEDICINE

## 2025-08-20 PROCEDURE — 1036F TOBACCO NON-USER: CPT | Performed by: FAMILY MEDICINE

## 2025-08-20 PROCEDURE — 4010F ACE/ARB THERAPY RXD/TAKEN: CPT | Performed by: FAMILY MEDICINE

## 2025-08-20 PROCEDURE — 3075F SYST BP GE 130 - 139MM HG: CPT | Performed by: FAMILY MEDICINE

## 2025-08-20 PROCEDURE — 3078F DIAST BP <80 MM HG: CPT | Performed by: FAMILY MEDICINE

## 2025-08-20 RX ORDER — CLONIDINE HYDROCHLORIDE 0.1 MG/1
TABLET ORAL
COMMUNITY

## 2025-08-20 RX ORDER — TELMISARTAN 80 MG/1
80 TABLET ORAL DAILY
Qty: 90 TABLET | Refills: 1 | Status: SHIPPED | OUTPATIENT
Start: 2025-08-20

## 2025-08-20 RX ORDER — MONTELUKAST SODIUM 10 MG/1
10 TABLET ORAL NIGHTLY
Qty: 90 TABLET | Refills: 1 | Status: SHIPPED | OUTPATIENT
Start: 2025-08-20

## 2025-08-20 RX ORDER — ROSUVASTATIN CALCIUM 10 MG/1
10 TABLET, COATED ORAL DAILY
Qty: 90 TABLET | Refills: 1 | Status: SHIPPED | OUTPATIENT
Start: 2025-08-20 | End: 2025-11-18

## 2025-08-20 RX ORDER — ESTRADIOL 10 UG/1
TABLET, FILM COATED VAGINAL
COMMUNITY

## 2025-08-20 RX ORDER — HYDROCHLOROTHIAZIDE 25 MG/1
25 TABLET ORAL DAILY
Qty: 90 TABLET | Refills: 1 | Status: SHIPPED | OUTPATIENT
Start: 2025-08-20

## 2025-08-20 RX ORDER — CARVEDILOL 12.5 MG/1
12.5 TABLET ORAL 2 TIMES DAILY
Qty: 180 TABLET | Refills: 1 | Status: SHIPPED | OUTPATIENT
Start: 2025-08-20 | End: 2026-08-20

## 2025-08-20 RX ORDER — FLUTICASONE PROPIONATE 50 MCG
1 SPRAY, SUSPENSION (ML) NASAL DAILY
Qty: 48 G | Refills: 0 | Status: SHIPPED | OUTPATIENT
Start: 2025-08-20

## 2025-08-20 RX ORDER — FLUOXETINE HYDROCHLORIDE 40 MG/1
40 CAPSULE ORAL DAILY
Qty: 90 CAPSULE | Refills: 1 | Status: SHIPPED | OUTPATIENT
Start: 2025-08-20 | End: 2026-08-20

## 2025-08-20 RX ORDER — POTASSIUM CHLORIDE 20 MEQ/1
20 TABLET, EXTENDED RELEASE ORAL DAILY
Qty: 90 TABLET | Refills: 1 | Status: SHIPPED | OUTPATIENT
Start: 2025-08-20

## 2025-08-20 RX ORDER — SYRINGE,SAFETY WITH NEEDLE,1ML 25GX1"
SYRINGE (EA) MISCELLANEOUS
COMMUNITY
Start: 2025-04-28

## 2025-08-20 RX ORDER — AMLODIPINE BESYLATE 5 MG/1
5 TABLET ORAL DAILY
Qty: 90 TABLET | Refills: 1 | Status: SHIPPED | OUTPATIENT
Start: 2025-08-20

## 2025-08-20 RX ORDER — CLONAZEPAM 0.5 MG/1
TABLET ORAL
Qty: 45 TABLET | Refills: 1 | Status: SHIPPED | OUTPATIENT
Start: 2025-08-20

## 2025-08-20 ASSESSMENT — ENCOUNTER SYMPTOMS
PALPITATIONS: 0
ARTHRALGIAS: 0
CHEST TIGHTNESS: 0
FEVER: 0
CHILLS: 0
ABDOMINAL PAIN: 0
SHORTNESS OF BREATH: 0
CONFUSION: 0

## 2025-08-20 ASSESSMENT — PATIENT HEALTH QUESTIONNAIRE - PHQ9
SUM OF ALL RESPONSES TO PHQ9 QUESTIONS 1 AND 2: 0
1. LITTLE INTEREST OR PLEASURE IN DOING THINGS: NOT AT ALL
2. FEELING DOWN, DEPRESSED OR HOPELESS: NOT AT ALL

## 2025-09-04 ENCOUNTER — OFFICE VISIT (OUTPATIENT)
Dept: PRIMARY CARE | Facility: CLINIC | Age: 60
End: 2025-09-04
Payer: COMMERCIAL

## 2025-09-04 VITALS
TEMPERATURE: 97.1 F | HEIGHT: 67 IN | HEART RATE: 75 BPM | BODY MASS INDEX: 29.54 KG/M2 | RESPIRATION RATE: 14 BRPM | OXYGEN SATURATION: 98 % | DIASTOLIC BLOOD PRESSURE: 82 MMHG | WEIGHT: 188.2 LBS | SYSTOLIC BLOOD PRESSURE: 128 MMHG

## 2025-09-04 DIAGNOSIS — M54.42 ACUTE BILATERAL LOW BACK PAIN WITH BILATERAL SCIATICA: Primary | ICD-10-CM

## 2025-09-04 DIAGNOSIS — M54.41 ACUTE BILATERAL LOW BACK PAIN WITH BILATERAL SCIATICA: Primary | ICD-10-CM

## 2025-09-04 PROCEDURE — 3079F DIAST BP 80-89 MM HG: CPT | Performed by: FAMILY MEDICINE

## 2025-09-04 PROCEDURE — 99213 OFFICE O/P EST LOW 20 MIN: CPT | Performed by: FAMILY MEDICINE

## 2025-09-04 PROCEDURE — 1036F TOBACCO NON-USER: CPT | Performed by: FAMILY MEDICINE

## 2025-09-04 PROCEDURE — 4010F ACE/ARB THERAPY RXD/TAKEN: CPT | Performed by: FAMILY MEDICINE

## 2025-09-04 PROCEDURE — 3074F SYST BP LT 130 MM HG: CPT | Performed by: FAMILY MEDICINE

## 2025-09-04 PROCEDURE — 3008F BODY MASS INDEX DOCD: CPT | Performed by: FAMILY MEDICINE

## 2025-09-04 RX ORDER — NAPROXEN 500 MG/1
500 TABLET ORAL 2 TIMES DAILY PRN
Qty: 60 TABLET | Refills: 0 | Status: SHIPPED | OUTPATIENT
Start: 2025-09-04 | End: 2025-12-03

## 2025-09-04 RX ORDER — TIZANIDINE 2 MG/1
2 TABLET ORAL NIGHTLY PRN
Qty: 30 TABLET | Refills: 0 | Status: SHIPPED | OUTPATIENT
Start: 2025-09-04 | End: 2025-09-14

## 2025-09-04 ASSESSMENT — ENCOUNTER SYMPTOMS
ARTHRALGIAS: 0
CONSTITUTIONAL NEGATIVE: 1
NEUROLOGICAL NEGATIVE: 1
CARDIOVASCULAR NEGATIVE: 1
BACK PAIN: 1
RESPIRATORY NEGATIVE: 1

## 2025-09-04 ASSESSMENT — PATIENT HEALTH QUESTIONNAIRE - PHQ9
1. LITTLE INTEREST OR PLEASURE IN DOING THINGS: NOT AT ALL
2. FEELING DOWN, DEPRESSED OR HOPELESS: NOT AT ALL
SUM OF ALL RESPONSES TO PHQ9 QUESTIONS 1 AND 2: 0

## 2025-12-04 ENCOUNTER — APPOINTMENT (OUTPATIENT)
Dept: OBSTETRICS AND GYNECOLOGY | Facility: CLINIC | Age: 60
End: 2025-12-04
Payer: COMMERCIAL

## 2025-12-23 ENCOUNTER — APPOINTMENT (OUTPATIENT)
Dept: PRIMARY CARE | Facility: CLINIC | Age: 60
End: 2025-12-23
Payer: COMMERCIAL